# Patient Record
Sex: MALE | Race: WHITE | NOT HISPANIC OR LATINO | Employment: OTHER | URBAN - NONMETROPOLITAN AREA
[De-identification: names, ages, dates, MRNs, and addresses within clinical notes are randomized per-mention and may not be internally consistent; named-entity substitution may affect disease eponyms.]

---

## 2017-02-10 RX ORDER — ALLOPURINOL 300 MG/1
TABLET ORAL
Qty: 30 TABLET | Refills: 0 | Status: SHIPPED | OUTPATIENT
Start: 2017-02-10 | End: 2017-03-24 | Stop reason: SDUPTHER

## 2017-02-10 RX ORDER — COLCHICINE 0.6 MG/1
TABLET ORAL
Qty: 30 TABLET | Refills: 0 | Status: SHIPPED | OUTPATIENT
Start: 2017-02-10 | End: 2017-08-16 | Stop reason: SDUPTHER

## 2017-02-16 ENCOUNTER — OFFICE VISIT (OUTPATIENT)
Dept: FAMILY MEDICINE CLINIC | Facility: CLINIC | Age: 62
End: 2017-02-16

## 2017-02-16 ENCOUNTER — LAB (OUTPATIENT)
Dept: LAB | Facility: OTHER | Age: 62
End: 2017-02-16

## 2017-02-16 VITALS
OXYGEN SATURATION: 98 % | HEART RATE: 94 BPM | WEIGHT: 167.8 LBS | BODY MASS INDEX: 25.43 KG/M2 | HEIGHT: 68 IN | TEMPERATURE: 100 F

## 2017-02-16 DIAGNOSIS — R05.9 COUGH: ICD-10-CM

## 2017-02-16 DIAGNOSIS — R53.83 FATIGUE, UNSPECIFIED TYPE: ICD-10-CM

## 2017-02-16 DIAGNOSIS — R05.9 COUGH: Primary | ICD-10-CM

## 2017-02-16 DIAGNOSIS — J11.1 FLU: ICD-10-CM

## 2017-02-16 DIAGNOSIS — Z00.00 ROUTINE MEDICAL EXAM: ICD-10-CM

## 2017-02-16 DIAGNOSIS — Z00.00 ROUTINE MEDICAL EXAM: Primary | ICD-10-CM

## 2017-02-16 LAB
FLUAV AG NPH QL: NEGATIVE
FLUBV AG NPH QL IA: POSITIVE

## 2017-02-16 PROCEDURE — 87804 INFLUENZA ASSAY W/OPTIC: CPT | Performed by: INTERNAL MEDICINE

## 2017-02-16 PROCEDURE — 99213 OFFICE O/P EST LOW 20 MIN: CPT | Performed by: INTERNAL MEDICINE

## 2017-02-16 RX ORDER — OSELTAMIVIR PHOSPHATE 75 MG/1
75 CAPSULE ORAL 2 TIMES DAILY
Qty: 10 CAPSULE | Refills: 0 | Status: SHIPPED | OUTPATIENT
Start: 2017-02-16 | End: 2017-11-06

## 2017-02-16 NOTE — PROGRESS NOTES
Bernadette Oscar is a 62 y.o. male.     Chief Complaint   Patient presents with   • Nasal Congestion     Started last Saturday. Has been around people with Flu.   • Generalized Body Aches   • Cough   • Fever        History of Present Illness     Pt in c/o congestion, cough, and fever.     Pt says he has a cough that is productive.  His ears have been hurting and he has a fever.  His throat is only sore when he coughs and has some pressure in his head.  He says he just feels bad with all these symptoms.     The following portions of the patient's history were reviewed and updated as appropriate: allergies, current medications, past family history, past medical history, past social history, past surgical history and problem list.    Review of Systems   Constitutional: Positive for fatigue and fever. Negative for activity change, appetite change and unexpected weight change.   HENT: Positive for ear pain, sinus pressure and sore throat. Negative for facial swelling and hearing loss.    Respiratory: Positive for cough. Negative for chest tightness, shortness of breath and wheezing.    Cardiovascular: Negative for chest pain, palpitations and leg swelling.   Gastrointestinal: Negative for abdominal pain.   Genitourinary: Negative for difficulty urinating, dysuria, flank pain, frequency and urgency.   Musculoskeletal: Negative for arthralgias and back pain.   Skin: Negative for color change and rash.   Allergic/Immunologic: Negative for environmental allergies and food allergies.   Neurological: Negative for dizziness, syncope, weakness, numbness and headaches.   Hematological: Negative for adenopathy.   Psychiatric/Behavioral: Negative for confusion, decreased concentration, dysphoric mood, sleep disturbance and suicidal ideas. The patient is not nervous/anxious.    All other systems reviewed and are negative.    In past two weeks the patient has not felt down, depressed, hopeless, or lost interest in doing  things.      Objective   Physical Exam   Constitutional: He is oriented to person, place, and time. Vital signs are normal. He appears well-developed and well-nourished.   HENT:   Head: Normocephalic and atraumatic.   Right Ear: Hearing and external ear normal. There is tenderness.   Left Ear: Hearing and external ear normal.   Nose: Right sinus exhibits frontal sinus tenderness. Left sinus exhibits frontal sinus tenderness.   Eyes: Conjunctivae and EOM are normal. Pupils are equal, round, and reactive to light.   Neck: Normal range of motion. Neck supple.   Cardiovascular: Normal rate and regular rhythm.  Exam reveals no gallop and no friction rub.    No murmur heard.  Pulmonary/Chest: Effort normal. No respiratory distress. He has no wheezes. He has rhonchi. He has no rales.   Abdominal: Soft. He exhibits no distension. There is no tenderness. There is no rebound and no guarding.   Musculoskeletal: Normal range of motion. He exhibits no edema.   Neurological: He is alert and oriented to person, place, and time. No cranial nerve deficit.   Skin: Skin is warm and dry. No rash noted.   Psychiatric: He has a normal mood and affect. His behavior is normal. Judgment and thought content normal. His mood appears not anxious. He does not exhibit a depressed mood. He expresses no homicidal and no suicidal ideation.   Nursing note and vitals reviewed.      Assessment/Plan   Don was seen today for nasal congestion, generalized body aches, cough and fever.    Diagnoses and all orders for this visit:    Routine medical exam  -     Cancel: Influenza Antigen; Future  -     Influenza Antigen; Future    Cough  Comments:  new    Fatigue, unspecified type  Comments:  new    Flu  Comments:  New    Other orders  -     oseltamivir (TAMIFLU) 75 MG capsule; Take 1 capsule by mouth 2 (Two) Times a Day.        STAT     CXR - coughing  Flu Swab - exposed to flu/coughing.    Scribed for Dr. Munoz by Fabiano Jauregui 02/16/17       CXR normal.     Flu swab was Positive    Add Tamiflu X5 days for flu.    Scribed for Dr. Munoz by Fabiano Jauregui 02/16/17

## 2017-03-24 RX ORDER — ALLOPURINOL 300 MG/1
TABLET ORAL
Qty: 30 TABLET | Refills: 0 | Status: SHIPPED | OUTPATIENT
Start: 2017-03-24 | End: 2017-08-16 | Stop reason: SDUPTHER

## 2017-03-24 RX ORDER — ALLOPURINOL 100 MG/1
TABLET ORAL
Qty: 30 TABLET | Refills: 0 | Status: SHIPPED | OUTPATIENT
Start: 2017-03-24 | End: 2017-08-16 | Stop reason: SDUPTHER

## 2017-08-17 RX ORDER — ALLOPURINOL 100 MG/1
TABLET ORAL
Qty: 30 TABLET | Refills: 0 | Status: SHIPPED | OUTPATIENT
Start: 2017-08-17 | End: 2017-11-06 | Stop reason: SDUPTHER

## 2017-08-17 RX ORDER — ALLOPURINOL 300 MG/1
TABLET ORAL
Qty: 30 TABLET | Refills: 0 | Status: SHIPPED | OUTPATIENT
Start: 2017-08-17 | End: 2017-11-06 | Stop reason: SDUPTHER

## 2017-08-17 RX ORDER — COLCHICINE 0.6 MG/1
CAPSULE ORAL
Qty: 30 CAPSULE | Refills: 0 | Status: SHIPPED | OUTPATIENT
Start: 2017-08-17 | End: 2017-12-18

## 2017-11-06 DIAGNOSIS — Z00.00 ROUTINE GENERAL MEDICAL EXAMINATION AT A HEALTH CARE FACILITY: Primary | ICD-10-CM

## 2017-11-06 RX ORDER — ALLOPURINOL 100 MG/1
TABLET ORAL
Qty: 30 TABLET | Refills: 0 | OUTPATIENT
Start: 2017-11-06

## 2017-11-06 RX ORDER — ALLOPURINOL 100 MG/1
100 TABLET ORAL DAILY
Qty: 14 TABLET | Refills: 0 | Status: SHIPPED | OUTPATIENT
Start: 2017-11-06 | End: 2018-02-27 | Stop reason: SDUPTHER

## 2017-11-06 RX ORDER — ALLOPURINOL 300 MG/1
300 TABLET ORAL DAILY
Qty: 14 TABLET | Refills: 0 | Status: SHIPPED | OUTPATIENT
Start: 2017-11-06 | End: 2018-02-27 | Stop reason: SDUPTHER

## 2017-11-06 RX ORDER — ALLOPURINOL 300 MG/1
TABLET ORAL
Qty: 30 TABLET | Refills: 0 | Status: CANCELLED | OUTPATIENT
Start: 2017-11-06

## 2017-11-20 ENCOUNTER — LAB (OUTPATIENT)
Dept: LAB | Facility: OTHER | Age: 62
End: 2017-11-20

## 2017-11-20 DIAGNOSIS — Z00.00 ROUTINE GENERAL MEDICAL EXAMINATION AT A HEALTH CARE FACILITY: ICD-10-CM

## 2017-11-20 LAB
ALBUMIN SERPL-MCNC: 4 G/DL (ref 3.2–5.5)
ALBUMIN/GLOB SERPL: 1.5 G/DL (ref 1–3)
ALP SERPL-CCNC: 71 U/L (ref 15–121)
ALT SERPL W P-5'-P-CCNC: 27 U/L (ref 10–60)
ANION GAP SERPL CALCULATED.3IONS-SCNC: 9 MMOL/L (ref 5–15)
AST SERPL-CCNC: 21 U/L (ref 10–60)
BASOPHILS # BLD AUTO: 0.01 10*3/MM3 (ref 0–0.2)
BASOPHILS NFR BLD AUTO: 0.1 % (ref 0–2)
BILIRUB SERPL-MCNC: 0.9 MG/DL (ref 0.2–1)
BUN BLD-MCNC: 23 MG/DL (ref 8–25)
BUN/CREAT SERPL: 20.9 (ref 7–25)
CALCIUM SPEC-SCNC: 9.1 MG/DL (ref 8.4–10.8)
CHLORIDE SERPL-SCNC: 106 MMOL/L (ref 100–112)
CHOLEST SERPL-MCNC: 235 MG/DL (ref 150–200)
CO2 SERPL-SCNC: 28 MMOL/L (ref 20–32)
CREAT BLD-MCNC: 1.1 MG/DL (ref 0.4–1.3)
DEPRECATED RDW RBC AUTO: 40.7 FL (ref 35.1–43.9)
EOSINOPHIL # BLD AUTO: 0.13 10*3/MM3 (ref 0–0.7)
EOSINOPHIL NFR BLD AUTO: 1.9 % (ref 0–7)
ERYTHROCYTE [DISTWIDTH] IN BLOOD BY AUTOMATED COUNT: 12.5 % (ref 11.5–14.5)
GFR SERPL CREATININE-BSD FRML MDRD: 68 ML/MIN/1.73 (ref 49–113)
GLOBULIN UR ELPH-MCNC: 2.7 GM/DL (ref 2.5–4.6)
GLUCOSE BLD-MCNC: 119 MG/DL (ref 70–100)
HCT VFR BLD AUTO: 44.5 % (ref 39–49)
HDLC SERPL-MCNC: 36 MG/DL (ref 35–100)
HGB BLD-MCNC: 14.7 G/DL (ref 13.7–17.3)
LDLC SERPL CALC-MCNC: 176 MG/DL
LDLC/HDLC SERPL: 4.88 {RATIO}
LYMPHOCYTES # BLD AUTO: 1.6 10*3/MM3 (ref 0.6–4.2)
LYMPHOCYTES NFR BLD AUTO: 23.6 % (ref 10–50)
MCH RBC QN AUTO: 30.3 PG (ref 26.5–34)
MCHC RBC AUTO-ENTMCNC: 33 G/DL (ref 31.5–36.3)
MCV RBC AUTO: 91.8 FL (ref 80–98)
MONOCYTES # BLD AUTO: 0.53 10*3/MM3 (ref 0–0.9)
MONOCYTES NFR BLD AUTO: 7.8 % (ref 0–12)
NEUTROPHILS # BLD AUTO: 4.51 10*3/MM3 (ref 2–8.6)
NEUTROPHILS NFR BLD AUTO: 66.6 % (ref 37–80)
PLATELET # BLD AUTO: 282 10*3/MM3 (ref 150–450)
PMV BLD AUTO: 9 FL (ref 8–12)
POTASSIUM BLD-SCNC: 4.2 MMOL/L (ref 3.4–5.4)
PROT SERPL-MCNC: 6.7 G/DL (ref 6.7–8.2)
RBC # BLD AUTO: 4.85 10*6/MM3 (ref 4.37–5.74)
SODIUM BLD-SCNC: 143 MMOL/L (ref 134–146)
T4 SERPL-MCNC: 6.69 MCG/DL (ref 5.5–11)
TRIGL SERPL-MCNC: 117 MG/DL (ref 35–160)
TSH SERPL DL<=0.05 MIU/L-ACNC: 0.95 MIU/ML (ref 0.46–4.68)
VLDLC SERPL-MCNC: 23.4 MG/DL
WBC NRBC COR # BLD: 6.78 10*3/MM3 (ref 3.2–9.8)

## 2017-11-20 PROCEDURE — 36415 COLL VENOUS BLD VENIPUNCTURE: CPT | Performed by: INTERNAL MEDICINE

## 2017-11-20 PROCEDURE — 84443 ASSAY THYROID STIM HORMONE: CPT | Performed by: INTERNAL MEDICINE

## 2017-11-20 PROCEDURE — 84436 ASSAY OF TOTAL THYROXINE: CPT | Performed by: INTERNAL MEDICINE

## 2017-11-20 PROCEDURE — 85025 COMPLETE CBC W/AUTO DIFF WBC: CPT | Performed by: INTERNAL MEDICINE

## 2017-11-20 PROCEDURE — 80061 LIPID PANEL: CPT | Performed by: INTERNAL MEDICINE

## 2017-11-20 PROCEDURE — 80053 COMPREHEN METABOLIC PANEL: CPT | Performed by: INTERNAL MEDICINE

## 2017-12-05 ENCOUNTER — TELEPHONE (OUTPATIENT)
Dept: FAMILY MEDICINE CLINIC | Facility: CLINIC | Age: 62
End: 2017-12-05

## 2017-12-05 NOTE — TELEPHONE ENCOUNTER
Call placed to pt at this time and unable to reach but did leave VM for pt to call [phone] at this time and did put orders in for Crestor 10mg po Q hs and lipid panel in 4 months and BMP and A1C in 6 wks.

## 2017-12-05 NOTE — TELEPHONE ENCOUNTER
----- Message from Basia Renee sent at 11/30/2017 12:10 PM CST -----  Appointment canceled twice would like to know about labs

## 2017-12-18 ENCOUNTER — OFFICE VISIT (OUTPATIENT)
Dept: FAMILY MEDICINE CLINIC | Facility: CLINIC | Age: 62
End: 2017-12-18

## 2017-12-18 VITALS
BODY MASS INDEX: 27.13 KG/M2 | DIASTOLIC BLOOD PRESSURE: 78 MMHG | SYSTOLIC BLOOD PRESSURE: 144 MMHG | HEIGHT: 68 IN | WEIGHT: 179 LBS | HEART RATE: 72 BPM | OXYGEN SATURATION: 99 %

## 2017-12-18 DIAGNOSIS — J06.9 VIRAL UPPER RESPIRATORY TRACT INFECTION: Primary | ICD-10-CM

## 2017-12-18 DIAGNOSIS — E78.01 FAMILIAL HYPERCHOLESTEROLEMIA: Chronic | ICD-10-CM

## 2017-12-18 PROCEDURE — 99214 OFFICE O/P EST MOD 30 MIN: CPT | Performed by: INTERNAL MEDICINE

## 2017-12-18 PROCEDURE — 96372 THER/PROPH/DIAG INJ SC/IM: CPT | Performed by: INTERNAL MEDICINE

## 2017-12-18 RX ORDER — AZITHROMYCIN 250 MG/1
TABLET, FILM COATED ORAL
Qty: 6 TABLET | Refills: 0 | Status: SHIPPED | OUTPATIENT
Start: 2017-12-18 | End: 2018-06-08

## 2017-12-18 RX ORDER — TRIAMCINOLONE ACETONIDE 40 MG/ML
40 INJECTION, SUSPENSION INTRA-ARTICULAR; INTRAMUSCULAR ONCE
Status: COMPLETED | OUTPATIENT
Start: 2017-12-18 | End: 2017-12-18

## 2017-12-18 RX ADMIN — TRIAMCINOLONE ACETONIDE 40 MG: 40 INJECTION, SUSPENSION INTRA-ARTICULAR; INTRAMUSCULAR at 16:37

## 2018-02-28 RX ORDER — ALLOPURINOL 100 MG/1
TABLET ORAL
Qty: 14 TABLET | Refills: 0 | Status: SHIPPED | OUTPATIENT
Start: 2018-02-28 | End: 2018-04-24 | Stop reason: SDUPTHER

## 2018-02-28 RX ORDER — ALLOPURINOL 300 MG/1
TABLET ORAL
Qty: 14 TABLET | Refills: 0 | Status: SHIPPED | OUTPATIENT
Start: 2018-02-28 | End: 2018-04-24 | Stop reason: SDUPTHER

## 2018-04-24 RX ORDER — ALLOPURINOL 100 MG/1
TABLET ORAL
Qty: 14 TABLET | Refills: 0 | Status: SHIPPED | OUTPATIENT
Start: 2018-04-24 | End: 2018-08-10 | Stop reason: SDUPTHER

## 2018-04-24 RX ORDER — ALLOPURINOL 300 MG/1
TABLET ORAL
Qty: 14 TABLET | Refills: 0 | Status: SHIPPED | OUTPATIENT
Start: 2018-04-24 | End: 2018-06-20

## 2018-06-08 ENCOUNTER — OFFICE VISIT (OUTPATIENT)
Dept: FAMILY MEDICINE CLINIC | Facility: CLINIC | Age: 63
End: 2018-06-08

## 2018-06-08 VITALS
HEART RATE: 70 BPM | HEIGHT: 68 IN | BODY MASS INDEX: 25.31 KG/M2 | DIASTOLIC BLOOD PRESSURE: 80 MMHG | SYSTOLIC BLOOD PRESSURE: 170 MMHG | OXYGEN SATURATION: 98 % | WEIGHT: 167 LBS

## 2018-06-08 DIAGNOSIS — E78.2 MIXED HYPERLIPIDEMIA: Chronic | ICD-10-CM

## 2018-06-08 DIAGNOSIS — I10 ESSENTIAL HYPERTENSION: Chronic | ICD-10-CM

## 2018-06-08 DIAGNOSIS — Z12.5 SPECIAL SCREENING FOR MALIGNANT NEOPLASM OF PROSTATE: ICD-10-CM

## 2018-06-08 DIAGNOSIS — R73.01 IMPAIRED FASTING GLUCOSE: Chronic | ICD-10-CM

## 2018-06-08 DIAGNOSIS — M1A.9XX0 CHRONIC GOUT WITHOUT TOPHUS, UNSPECIFIED CAUSE, UNSPECIFIED SITE: Chronic | ICD-10-CM

## 2018-06-08 DIAGNOSIS — R55 SYNCOPE, UNSPECIFIED SYNCOPE TYPE: Primary | ICD-10-CM

## 2018-06-08 PROBLEM — Z77.22 SECONDHAND SMOKE EXPOSURE: Chronic | Status: ACTIVE | Noted: 2018-06-08

## 2018-06-08 PROCEDURE — 93000 ELECTROCARDIOGRAM COMPLETE: CPT | Performed by: INTERNAL MEDICINE

## 2018-06-08 PROCEDURE — 99214 OFFICE O/P EST MOD 30 MIN: CPT | Performed by: INTERNAL MEDICINE

## 2018-06-08 RX ORDER — LOSARTAN POTASSIUM 50 MG/1
50 TABLET ORAL DAILY
Qty: 30 TABLET | Refills: 11 | Status: SHIPPED | OUTPATIENT
Start: 2018-06-08 | End: 2019-05-28 | Stop reason: SDUPTHER

## 2018-06-08 NOTE — PROGRESS NOTES
Subjective      History of Present Illness     Don Oscar is a 63 y.o. male who comes in today to establish care.  He is here with his wife, Beba.  They live in [facility] [place] [facility] and enjoy camping.  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   He is a prior patient of Dr. Cecil Munoz.  He reports a history of syncopal episodes.  He denies ever undergoing an extensive workup to determine the etiology of these episodes.    He has a history of gout.  His last gout attack was approximately seven weeks ago.  He is prescribed allopurinol 400 mg to take when he is having acute gout flares, but has never taken it daily as prophylaxis.      He takes an 81 mg aspirin q.o.d. He does not take it daily due to easy bruising.  GERD symptoms adequately controlled with OTC antacids.  Denies melena or dysphagia.    He has a strong family history of coronary artery disease.  He reports his brother had MI with stent placement.  He is not a smoker, but he is exposed to second hand smoke, as his wife is a smoker.  He smoked for about 7 years, stopping in his early twenties.   He denies current illicit drug use.  He reports history of drug use, but stopped around age 29.  He states he was told he had heart valve leak in the past due to past drug use, but denies ever using any IV drugs.  On exam today, I heard some rare ectopy versus brief sinus pauses.  EKG today reveals normal sinus rhythm with no pauses or ectopy.            He was camping at [facility] on June 2nd when his wife found him unresponsive due to an apparent syncopal episode.  There was a nurse camping next to them who assessed him.  He spontaneously recovered.  There was no witnessed seizure activity.  He remembers waking up to a stranger shouting at him to see if he was okay.  He was immediately transported him to [facility].  He was seen in the ER and given IV fluids as well as Zofran and clonidine for  hypertension.  Admission was recommended, but patient declined admission.  He admits to drinking two beer the night prior to this syncopal episode, but denies any drinking on the day that it occurred.  He denies any unilateral weakness, slurred speech, or facial droop at the time of this episode.  He reports history of syncopal episode approximately once a year, but denies ever having a cardiac workup.  He reports he has experienced other similar syncopal episodes occasionally throughout the years.  He feels that he has some mild residual proximal left upper extremity weakness following this most recent syncopal episode.   Abduction of the left upper extremity does seem to be slightly weaker than his right.  He is right-hand dominant.      Labs obtained at [facility] reviewed today.  CBC unremarkable.  Fasting glucose 192.  Creatinine 1.5.   His baseline creatinine appears to be 1.0.    I have reviewed labs from last Fall as well as 2015, which reveal evidence of impaired fasting glucose.  Total cholesterol is above goal.  HDL 36.  LDL above goal at 176.  Triglycerides 117.  Renal function has been normal with baseline serum creatinine 1.0.  It has been several years since he has received a PSA.  He is going to come in next week to get some appropriate labs.         Review of Systems   Constitutional: Negative for chills, fatigue and fever.   HENT: Negative for congestion, ear pain, postnasal drip, sinus pressure and sore throat.    Respiratory: Negative for cough, shortness of breath and wheezing.    Cardiovascular: Negative for chest pain, palpitations and leg swelling.   Gastrointestinal: Negative for abdominal pain, blood in stool, constipation, diarrhea, nausea and vomiting.   Endocrine: Negative for cold intolerance, heat intolerance, polydipsia and polyuria.   Genitourinary: Negative for dysuria, frequency, hematuria and urgency.   Skin: Negative for rash.   Neurological: Negative for syncope and  "weakness.        Objective     Vitals:    06/08/18 1525   BP: 170/80   Pulse: 70   SpO2: 98%   Weight: 75.8 kg (167 lb)   Height: 172.7 cm (68\")       Physical Exam   Constitutional: He is oriented to person, place, and time. He appears well-developed and well-nourished. No distress.   Accompanied by his wife, Beba.      HENT:   Head: Normocephalic and atraumatic.   Nose: Right sinus exhibits no maxillary sinus tenderness and no frontal sinus tenderness. Left sinus exhibits no maxillary sinus tenderness and no frontal sinus tenderness.   Mouth/Throat: Uvula is midline, oropharynx is clear and moist and mucous membranes are normal. No oral lesions. No tonsillar exudate.   Clear postnasal drip.    Eyes: Conjunctivae and EOM are normal. Pupils are equal, round, and reactive to light.   Bilateral sclera appears red/irritated.  He reports chronic dry eyes.  He denies significant allergy symptoms   Neck: Trachea normal. Neck supple. No JVD present. Carotid bruit is not present. No tracheal deviation present. No thyroid mass and no thyromegaly present.   Cardiovascular: Regular rhythm, normal heart sounds and intact distal pulses.   No extrasystoles are present. PMI is not displaced.    No murmur heard.  Regular rhythm with occasional brief pauses, which are asymptomatic.  2/6 systolic murmur heard at left sternal border.     Pulmonary/Chest: Effort normal and breath sounds normal. No accessory muscle usage. No respiratory distress. He has no decreased breath sounds. He has no wheezes. He has no rhonchi. He has no rales.   A few chronic lung sounds are noted, but no wheezes.   Abdominal: Soft. Bowel sounds are normal. He exhibits no distension. There is no hepatosplenomegaly. There is no tenderness.     Vascular Status -  His right foot exhibits abnormal foot vasculature  (Diminished pulses bilateral ankles. ). His right foot exhibits no edema. His left foot exhibits abnormal foot vasculature . His left foot exhibits no " edema.  Lymphadenopathy:     He has no cervical adenopathy.   Neurological: He is alert and oriented to person, place, and time. No cranial nerve deficit. Coordination normal.   Neuro exam is symmetrical and normal other than left upper extremity abduction 4/5.   Skin: Skin is warm, dry and intact. No rash noted. No cyanosis. Nails show no clubbing.   Psychiatric: He has a normal mood and affect. His speech is normal and behavior is normal. Judgment and thought content normal.   Vitals reviewed.       ECG 12 Lead  Date/Time: 6/8/2018 4:28 PM  Performed by: ETHAN FIELDS  Authorized by: ETHAN FIELDS   Comparison: not compared with previous ECG   Previous ECG: no previous ECG available  Rhythm: sinus rhythm  Rate: normal  Conduction: conduction normal  ST Segments: ST segments normal  T Waves: T waves normal  QRS axis: normal  Other: no other findings  Clinical impression: normal ECG  Comments: No ectopy or pauses noted.            Assessment/Plan      EKG is completed today revealing normal sinus rhythm.  I feel that he needs a thorough workup to determine the etiology of the syncopal episodes.  We will refer to [facility] in [place] for appropriate evaluation.  I recommended they call EMS immediately for any similar events.  Take a baby aspirin daily.  With his serum creatinine elevated in the [facility] ER, he may have been dehydrated at the time of his syncopal episode.  The differential diagnosis is very large.    A prescription is sent for losartan 50 mg to take one tablet daily.  Monitor blood pressure.  The losartan should be a good choice in this patient with history of gout.    Check uric acid level with next week's labs.  If well above goal, we will start some daily allopurinol prophylaxis.  In the future, we will use more appropriate medications to treat acute gout episodes if needed.    I will include a hemoglobin A1c in next week's labs due to his history of impaired fasting  glucose.    He will return next week for fasting labs and in two weeks for follow up and review of lab results.  I asked him to bring in the name of his OTC antacid with his next visit.       Scribed for Dr. Ríos by Ceci Hensley [facility].     Diagnoses and all orders for this visit:    Syncope, unspecified syncope type  -     Ambulatory Referral to Cardiology    Essential hypertension  -     CBC Auto Differential; Future  -     Comprehensive Metabolic Panel; Future    Mixed hyperlipidemia  -     Lipid Panel; Future  -     TSH; Future    Impaired fasting glucose  -     Hemoglobin A1c; Future    Chronic gout without tophus, unspecified cause, unspecified site  -     Uric Acid; Future    Special screening for malignant neoplasm of prostate  -     PSA Screen; Future    Other orders  -     losartan (COZAAR) 50 MG tablet; Take 1 tablet by mouth Daily.        No visits with results within 3 Week(s) from this visit.   Latest known visit with results is:   Lab on 11/20/2017   Component Date Value Ref Range Status   • Glucose 11/20/2017 119* 70 - 100 mg/dL Final   • BUN 11/20/2017 23  8 - 25 mg/dL Final   • Creatinine 11/20/2017 1.10  0.40 - 1.30 mg/dL Final   • Sodium 11/20/2017 143  134 - 146 mmol/L Final   • Potassium 11/20/2017 4.2  3.4 - 5.4 mmol/L Final   • Chloride 11/20/2017 106  100 - 112 mmol/L Final   • CO2 11/20/2017 28.0  20.0 - 32.0 mmol/L Final   • Calcium 11/20/2017 9.1  8.4 - 10.8 mg/dL Final   • Total Protein 11/20/2017 6.7  6.7 - 8.2 g/dL Final   • Albumin 11/20/2017 4.00  3.20 - 5.50 g/dL Final   • ALT (SGPT) 11/20/2017 27  10 - 60 U/L Final   • AST (SGOT) 11/20/2017 21  10 - 60 U/L Final   • Alkaline Phosphatase 11/20/2017 71  15 - 121 U/L Final   • Total Bilirubin 11/20/2017 0.9  0.2 - 1.0 mg/dL Final   • eGFR Non  Amer 11/20/2017 68  49 - 113 mL/min/1.73 Final   • Globulin 11/20/2017 2.7  2.5 - 4.6 gm/dL Final   • A/G Ratio 11/20/2017 1.5  1.0 - 3.0 g/dL Final   • BUN/Creatinine Ratio  11/20/2017 20.9  7.0 - 25.0 Final   • Anion Gap 11/20/2017 9.0  5.0 - 15.0 mmol/L Final   • TSH 11/20/2017 0.950  0.460 - 4.680 mIU/mL Final   • T4, Total 11/20/2017 6.69  5.50 - 11.00 mcg/dL Final   • Total Cholesterol 11/20/2017 235* 150 - 200 mg/dL Final   • Triglycerides 11/20/2017 117  35 - 160 mg/dL Final   • HDL Cholesterol 11/20/2017 36  35 - 100 mg/dL Final   • LDL Cholesterol  11/20/2017 176  mg/dL Final   • VLDL Cholesterol 11/20/2017 23.4  mg/dL Final   • LDL/HDL Ratio 11/20/2017 4.88   Final   • WBC 11/20/2017 6.78  3.20 - 9.80 10*3/mm3 Final   • RBC 11/20/2017 4.85  4.37 - 5.74 10*6/mm3 Final   • Hemoglobin 11/20/2017 14.7  13.7 - 17.3 g/dL Final   • Hematocrit 11/20/2017 44.5  39.0 - 49.0 % Final   • MCV 11/20/2017 91.8  80.0 - 98.0 fL Final   • MCH 11/20/2017 30.3  26.5 - 34.0 pg Final   • MCHC 11/20/2017 33.0  31.5 - 36.3 g/dL Final   • RDW 11/20/2017 12.5  11.5 - 14.5 % Final   • RDW-SD 11/20/2017 40.7  35.1 - 43.9 fl Final   • MPV 11/20/2017 9.0  8.0 - 12.0 fL Final   • Platelets 11/20/2017 282  150 - 450 10*3/mm3 Final   • Neutrophil % 11/20/2017 66.6  37.0 - 80.0 % Final   • Lymphocyte % 11/20/2017 23.6  10.0 - 50.0 % Final   • Monocyte % 11/20/2017 7.8  0.0 - 12.0 % Final   • Eosinophil % 11/20/2017 1.9  0.0 - 7.0 % Final   • Basophil % 11/20/2017 0.1  0.0 - 2.0 % Final   • Neutrophils, Absolute 11/20/2017 4.51  2.00 - 8.60 10*3/mm3 Final   • Lymphocytes, Absolute 11/20/2017 1.60  0.60 - 4.20 10*3/mm3 Final   • Monocytes, Absolute 11/20/2017 0.53  0.00 - 0.90 10*3/mm3 Final   • Eosinophils, Absolute 11/20/2017 0.13  0.00 - 0.70 10*3/mm3 Final   • Basophils, Absolute 11/20/2017 0.01  0.00 - 0.20 10*3/mm3 Final   ]

## 2018-06-11 ENCOUNTER — LAB (OUTPATIENT)
Dept: LAB | Facility: OTHER | Age: 63
End: 2018-06-11

## 2018-06-11 DIAGNOSIS — E78.2 MIXED HYPERLIPIDEMIA: Chronic | ICD-10-CM

## 2018-06-11 DIAGNOSIS — Z12.5 SPECIAL SCREENING FOR MALIGNANT NEOPLASM OF PROSTATE: ICD-10-CM

## 2018-06-11 DIAGNOSIS — R73.01 IMPAIRED FASTING GLUCOSE: Chronic | ICD-10-CM

## 2018-06-11 DIAGNOSIS — I10 ESSENTIAL HYPERTENSION: Chronic | ICD-10-CM

## 2018-06-11 DIAGNOSIS — M1A.9XX0 CHRONIC GOUT WITHOUT TOPHUS, UNSPECIFIED CAUSE, UNSPECIFIED SITE: Chronic | ICD-10-CM

## 2018-06-11 LAB
ALBUMIN SERPL-MCNC: 4.2 G/DL (ref 3.2–5.5)
ALBUMIN/GLOB SERPL: 1.6 G/DL (ref 1–3)
ALP SERPL-CCNC: 72 U/L (ref 15–121)
ALT SERPL W P-5'-P-CCNC: 20 U/L (ref 10–60)
ANION GAP SERPL CALCULATED.3IONS-SCNC: 8 MMOL/L (ref 5–15)
AST SERPL-CCNC: 17 U/L (ref 10–60)
BASOPHILS # BLD AUTO: 0.02 10*3/MM3 (ref 0–0.2)
BASOPHILS NFR BLD AUTO: 0.3 % (ref 0–2)
BILIRUB SERPL-MCNC: 1.1 MG/DL (ref 0.2–1)
BUN BLD-MCNC: 19 MG/DL (ref 8–25)
BUN/CREAT SERPL: 15.8 (ref 7–25)
CALCIUM SPEC-SCNC: 9.2 MG/DL (ref 8.4–10.8)
CHLORIDE SERPL-SCNC: 101 MMOL/L (ref 100–112)
CHOLEST SERPL-MCNC: 235 MG/DL (ref 150–200)
CO2 SERPL-SCNC: 29 MMOL/L (ref 20–32)
CREAT BLD-MCNC: 1.2 MG/DL (ref 0.4–1.3)
DEPRECATED RDW RBC AUTO: 41.6 FL (ref 35.1–43.9)
EOSINOPHIL # BLD AUTO: 0.14 10*3/MM3 (ref 0–0.7)
EOSINOPHIL NFR BLD AUTO: 1.9 % (ref 0–7)
ERYTHROCYTE [DISTWIDTH] IN BLOOD BY AUTOMATED COUNT: 12.8 % (ref 11.5–14.5)
GFR SERPL CREATININE-BSD FRML MDRD: 61 ML/MIN/1.73 (ref 49–113)
GLOBULIN UR ELPH-MCNC: 2.7 GM/DL (ref 2.5–4.6)
GLUCOSE BLD-MCNC: 124 MG/DL (ref 70–100)
HBA1C MFR BLD: 5.8 % (ref 4–5.6)
HCT VFR BLD AUTO: 46.7 % (ref 39–49)
HDLC SERPL-MCNC: 33 MG/DL (ref 35–100)
HGB BLD-MCNC: 15.7 G/DL (ref 13.7–17.3)
LDLC SERPL CALC-MCNC: 170 MG/DL
LDLC/HDLC SERPL: 5.15 {RATIO}
LYMPHOCYTES # BLD AUTO: 1.82 10*3/MM3 (ref 0.6–4.2)
LYMPHOCYTES NFR BLD AUTO: 24.9 % (ref 10–50)
MCH RBC QN AUTO: 30.4 PG (ref 26.5–34)
MCHC RBC AUTO-ENTMCNC: 33.6 G/DL (ref 31.5–36.3)
MCV RBC AUTO: 90.5 FL (ref 80–98)
MONOCYTES # BLD AUTO: 0.61 10*3/MM3 (ref 0–0.9)
MONOCYTES NFR BLD AUTO: 8.3 % (ref 0–12)
NEUTROPHILS # BLD AUTO: 4.72 10*3/MM3 (ref 2–8.6)
NEUTROPHILS NFR BLD AUTO: 64.6 % (ref 37–80)
PLATELET # BLD AUTO: 285 10*3/MM3 (ref 150–450)
PMV BLD AUTO: 9 FL (ref 8–12)
POTASSIUM BLD-SCNC: 3.9 MMOL/L (ref 3.4–5.4)
PROT SERPL-MCNC: 6.9 G/DL (ref 6.7–8.2)
PSA SERPL-MCNC: 0.83 NG/ML (ref 0–4)
RBC # BLD AUTO: 5.16 10*6/MM3 (ref 4.37–5.74)
SODIUM BLD-SCNC: 138 MMOL/L (ref 134–146)
TRIGL SERPL-MCNC: 161 MG/DL (ref 35–160)
TSH SERPL DL<=0.05 MIU/L-ACNC: 1.59 MIU/ML (ref 0.46–4.68)
URATE SERPL-MCNC: 8.2 MG/DL (ref 2.6–7.2)
VLDLC SERPL-MCNC: 32.2 MG/DL
WBC NRBC COR # BLD: 7.31 10*3/MM3 (ref 3.2–9.8)

## 2018-06-11 PROCEDURE — 36415 COLL VENOUS BLD VENIPUNCTURE: CPT | Performed by: INTERNAL MEDICINE

## 2018-06-11 PROCEDURE — 83036 HEMOGLOBIN GLYCOSYLATED A1C: CPT | Performed by: INTERNAL MEDICINE

## 2018-06-11 PROCEDURE — 80061 LIPID PANEL: CPT | Performed by: INTERNAL MEDICINE

## 2018-06-11 PROCEDURE — 85025 COMPLETE CBC W/AUTO DIFF WBC: CPT | Performed by: INTERNAL MEDICINE

## 2018-06-11 PROCEDURE — 84550 ASSAY OF BLOOD/URIC ACID: CPT | Performed by: INTERNAL MEDICINE

## 2018-06-11 PROCEDURE — 84443 ASSAY THYROID STIM HORMONE: CPT | Performed by: INTERNAL MEDICINE

## 2018-06-11 PROCEDURE — 80053 COMPREHEN METABOLIC PANEL: CPT | Performed by: INTERNAL MEDICINE

## 2018-06-11 PROCEDURE — G0103 PSA SCREENING: HCPCS | Performed by: INTERNAL MEDICINE

## 2018-06-20 ENCOUNTER — OFFICE VISIT (OUTPATIENT)
Dept: FAMILY MEDICINE CLINIC | Facility: CLINIC | Age: 63
End: 2018-06-20

## 2018-06-20 VITALS
BODY MASS INDEX: 25.46 KG/M2 | TEMPERATURE: 98.2 F | HEART RATE: 60 BPM | WEIGHT: 168 LBS | HEIGHT: 68 IN | SYSTOLIC BLOOD PRESSURE: 150 MMHG | DIASTOLIC BLOOD PRESSURE: 86 MMHG

## 2018-06-20 DIAGNOSIS — I10 ESSENTIAL HYPERTENSION: Primary | Chronic | ICD-10-CM

## 2018-06-20 DIAGNOSIS — M1A.9XX0 CHRONIC GOUT WITHOUT TOPHUS, UNSPECIFIED CAUSE, UNSPECIFIED SITE: Chronic | ICD-10-CM

## 2018-06-20 DIAGNOSIS — R73.01 IMPAIRED FASTING GLUCOSE: Chronic | ICD-10-CM

## 2018-06-20 DIAGNOSIS — E78.2 MIXED HYPERLIPIDEMIA: Chronic | ICD-10-CM

## 2018-06-20 DIAGNOSIS — Z77.22 SECONDHAND SMOKE EXPOSURE: Chronic | ICD-10-CM

## 2018-06-20 PROCEDURE — 99214 OFFICE O/P EST MOD 30 MIN: CPT | Performed by: INTERNAL MEDICINE

## 2018-06-20 RX ORDER — ATORVASTATIN CALCIUM 10 MG/1
10 TABLET, FILM COATED ORAL DAILY
Qty: 30 TABLET | Refills: 11 | Status: SHIPPED | OUTPATIENT
Start: 2018-06-20 | End: 2019-06-11 | Stop reason: SDUPTHER

## 2018-06-20 NOTE — PROGRESS NOTES
Subjective     History of Present Illness     Don Oscar is a 63 y.o. male who established care two weeks ago.  He lives in [facility] with his wife, Beba, and they enjoy camping.  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   He is a prior patient of Dr. Cecil Munoz.  He comes in today to review labs and address some of the issues discussed at his initial visit.     He has a strong family history of coronary artery disease.  He reports his brother had MI with stent placement.  On exam two weeks ago, I heard some rare ectopy versus brief sinus pauses.  EKG revealed normal sinus rhythm with no pauses or ectopy.  He had also been experiencing occasional syncopal episodes.  Refer to note dated 06/08/18 for details.  We referred to [facility] in [place] for appropriate evaluation and workup of syncopal episodes.  Appointment is scheduled with Dr. Rick Diaz next month. He is not a smoker, but he is exposed to second hand smoke, as his wife and rituon smoke in the home.  He smoked for about 7 years, stopping in his early twenties.  I ask him to try to convince his wife and stepson to smoke outside the home.      He has a history of gout.  His last gout attack was approximately seven weeks ago.  He is prescribed allopurinol 400 mg to take when he is having acute gout flares, but has never taken it daily as prophylaxis.  Uric acid remains above goal at 20 with current labs.  Our goal is to initiate more appropriate medications to treat acute gout episodes if needed. For now, we are going to have him start allopurinol 100 mg daily for gout prophylaxis.    Labs reveal evidence of impaired fasting glucose.  Carbohydrate reduction is recommended.      We started losartan two weeks ago.  Blood pressure remains above goal, but is improved at 150/86.  He reports he has felt better, but has not been checking it.  We will have him stay at this dose for  "now.  I asked him to stop by for a repeat blood pressure check in a couple of weeks.     His lipid panel is not at goal.  He has been on statin therapy in the past. We discussed cardiovascular risks associated with untreated cholesterol and recommended restarting statin therapy with Lipitor.  He voices agreement to this plan.        Colonoscopy in 2011 by Dr. Hayden revealed one polyp.  I encouraged repeat colonoscopy.  Patient wants to delay this until his next visit despite being made aware of risks of delaying procedure.      Serum creatinine was elevated in the [facility] ER at the time of his syncopal episode.  Current labs reveal slight decline in renal function from prior labs with creatinine 1.2.  I encouraged him to avoid chronic use of NSAIDs and other nephrotoxic agents and stay well hydrated.      The patient's relevant past medical, surgical, and social history was reviewed in Epic.   Lab results are reviewed with the patient today.  CBC unremarkable.   Fasting glucose 124. A1c is 5.8. Liver function normal.  PSA is at goal.        Review of Systems   Constitutional: Negative for chills, fatigue and fever.   HENT: Negative for congestion, ear pain, postnasal drip, sinus pressure and sore throat.    Respiratory: Negative for cough, shortness of breath and wheezing.    Cardiovascular: Negative for chest pain, palpitations and leg swelling.   Gastrointestinal: Negative for abdominal pain, blood in stool, constipation, diarrhea, nausea and vomiting.   Endocrine: Negative for cold intolerance, heat intolerance, polydipsia and polyuria.   Genitourinary: Negative for dysuria, frequency, hematuria and urgency.   Skin: Negative for rash.   Neurological: Negative for syncope and weakness.        Objective     Vitals:    06/20/18 0818   BP: 150/86   Pulse: 60   Temp: 98.2 °F (36.8 °C)   TempSrc: Oral   Weight: 76.2 kg (168 lb)   Height: 172.7 cm (68\")     Physical Exam   Constitutional: He is oriented to " person, place, and time. He appears well-developed and well-nourished. No distress.   HENT:   Head: Normocephalic and atraumatic.   Nose: Right sinus exhibits no maxillary sinus tenderness and no frontal sinus tenderness. Left sinus exhibits no maxillary sinus tenderness and no frontal sinus tenderness.   Mouth/Throat: Uvula is midline, oropharynx is clear and moist and mucous membranes are normal. No oral lesions. No tonsillar exudate.   Eyes: Conjunctivae and EOM are normal. Pupils are equal, round, and reactive to light.   Neck: Trachea normal. Neck supple. No JVD present. Carotid bruit is not present. No tracheal deviation present. No thyroid mass and no thyromegaly present.   Cardiovascular: Normal rate, regular rhythm, normal heart sounds and intact distal pulses.   No extrasystoles are present. PMI is not displaced.    No murmur heard.  2/6 systolic murmur heard at left sternal border   Pulmonary/Chest: Effort normal and breath sounds normal. No accessory muscle usage. No respiratory distress. He has no decreased breath sounds. He has no wheezes. He has no rhonchi. He has no rales.   Chronic lung sounds.    Abdominal: Soft. Bowel sounds are normal. He exhibits no distension. There is no hepatosplenomegaly. There is no tenderness.     Vascular Status -  His right foot exhibits abnormal foot vasculature  (Diminished pulses bilateral ankles.). His right foot exhibits no edema. His left foot exhibits abnormal foot vasculature . His left foot exhibits no edema.  Lymphadenopathy:     He has no cervical adenopathy.   Neurological: He is alert and oriented to person, place, and time. No cranial nerve deficit. Coordination normal.   Skin: Skin is warm, dry and intact. No rash noted. No cyanosis. Nails show no clubbing.   Psychiatric: He has a normal mood and affect. His speech is normal and behavior is normal. Judgment and thought content normal.   Vitals reviewed.     Future Appointments  Date Time Provider  Department Center   7/16/2018 11:15 AM Rick Diaz MD [facility] None   12/31/2018 8:30 AM Abel Ríos MD [facility] None     Assessment/Plan      Continue the losartan 50 mg to take one tablet daily.  Monitor blood pressure.  I asked him to stop by in a couple of weeks for blood pressure check.     Colonoscopy in 2011 by Dr. Hayden revealed one polyp.  I encouraged repeat colonoscopy.  Patient wants to delay this until his next visit despite being made aware of risks of delaying procedure.  We will address this again with next visit.     Start allopurinol 100 mg daily for gout prophylaxis.     Start Lipitor 10 mg daily and dietary efforts.     Keep the cardiology appointment with Dr. Rick Diaz to evaluate syncopal episodes. Continue daily 81 mg aspirin.    Return in six months for follow up with fasting labs one week prior.     Scribed for Dr. Ríos by Ceci Hensley [facility].     Diagnoses and all orders for this visit:    Essential hypertension  -     CBC Auto Differential; Future  -     Comprehensive Metabolic Panel; Future    Mixed hyperlipidemia  -     LDL Cholesterol, Direct; Future    Impaired fasting glucose  -     Hemoglobin A1c; Future    Chronic gout without tophus, unspecified cause, unspecified site  -     Uric Acid; Future    Secondhand smoke exposure    Other orders  -     atorvastatin (LIPITOR) 10 MG tablet; Take 1 tablet by mouth Daily. For cholesterol        Lab on 06/11/2018   Component Date Value Ref Range Status   • WBC 06/11/2018 7.31  3.20 - 9.80 10*3/mm3 Final   • RBC 06/11/2018 5.16  4.37 - 5.74 10*6/mm3 Final   • Hemoglobin 06/11/2018 15.7  13.7 - 17.3 g/dL Final   • Hematocrit 06/11/2018 46.7  39.0 - 49.0 % Final   • MCV 06/11/2018 90.5  80.0 - 98.0 fL Final   • MCH 06/11/2018 30.4  26.5 - 34.0 pg Final   • MCHC 06/11/2018 33.6  31.5 - 36.3 g/dL Final   • RDW 06/11/2018 12.8  11.5 - 14.5 % Final   • RDW-SD 06/11/2018 41.6  35.1 - 43.9 fl Final   • MPV 06/11/2018 9.0   8.0 - 12.0 fL Final   • Platelets 06/11/2018 285  150 - 450 10*3/mm3 Final   • Neutrophil % 06/11/2018 64.6  37.0 - 80.0 % Final   • Lymphocyte % 06/11/2018 24.9  10.0 - 50.0 % Final   • Monocyte % 06/11/2018 8.3  0.0 - 12.0 % Final   • Eosinophil % 06/11/2018 1.9  0.0 - 7.0 % Final   • Basophil % 06/11/2018 0.3  0.0 - 2.0 % Final   • Neutrophils, Absolute 06/11/2018 4.72  2.00 - 8.60 10*3/mm3 Final   • Lymphocytes, Absolute 06/11/2018 1.82  0.60 - 4.20 10*3/mm3 Final   • Monocytes, Absolute 06/11/2018 0.61  0.00 - 0.90 10*3/mm3 Final   • Eosinophils, Absolute 06/11/2018 0.14  0.00 - 0.70 10*3/mm3 Final   • Basophils, Absolute 06/11/2018 0.02  0.00 - 0.20 10*3/mm3 Final   • Glucose 06/11/2018 124* 70 - 100 mg/dL Final   • BUN 06/11/2018 19  8 - 25 mg/dL Final   • Creatinine 06/11/2018 1.20  0.40 - 1.30 mg/dL Final   • Sodium 06/11/2018 138  134 - 146 mmol/L Final   • Potassium 06/11/2018 3.9  3.4 - 5.4 mmol/L Final   • Chloride 06/11/2018 101  100 - 112 mmol/L Final   • CO2 06/11/2018 29.0  20.0 - 32.0 mmol/L Final   • Calcium 06/11/2018 9.2  8.4 - 10.8 mg/dL Final   • Total Protein 06/11/2018 6.9  6.7 - 8.2 g/dL Final   • Albumin 06/11/2018 4.20  3.20 - 5.50 g/dL Final   • ALT (SGPT) 06/11/2018 20  10 - 60 U/L Final   • AST (SGOT) 06/11/2018 17  10 - 60 U/L Final   • Alkaline Phosphatase 06/11/2018 72  15 - 121 U/L Final   • Total Bilirubin 06/11/2018 1.1* 0.2 - 1.0 mg/dL Final   • eGFR Non African Amer 06/11/2018 61  49 - 113 mL/min/1.73 Final   • Globulin 06/11/2018 2.7  2.5 - 4.6 gm/dL Final   • A/G Ratio 06/11/2018 1.6  1.0 - 3.0 g/dL Final   • BUN/Creatinine Ratio 06/11/2018 15.8  7.0 - 25.0 Final   • Anion Gap 06/11/2018 8.0  5.0 - 15.0 mmol/L Final   • Hemoglobin A1C 06/11/2018 5.8* 4 - 5.6 % Final   • Total Cholesterol 06/11/2018 235* 150 - 200 mg/dL Final   • Triglycerides 06/11/2018 161* 35 - 160 mg/dL Final   • HDL Cholesterol 06/11/2018 33* 35 - 100 mg/dL Final   • LDL Cholesterol  06/11/2018 170  mg/dL  Final   • VLDL Cholesterol 06/11/2018 32.2  mg/dL Final   • LDL/HDL Ratio 06/11/2018 5.15   Final   • TSH 06/11/2018 1.590  0.460 - 4.680 mIU/mL Final   • PSA 06/11/2018 0.829  0.000 - 4.000 ng/mL Final   • Uric Acid 06/11/2018 8.2* 2.6 - 7.2 mg/dL Final   ]

## 2018-07-03 DIAGNOSIS — R55 SYNCOPE, UNSPECIFIED SYNCOPE TYPE: Primary | ICD-10-CM

## 2018-08-10 RX ORDER — ALLOPURINOL 100 MG/1
100 TABLET ORAL DAILY
Qty: 14 TABLET | Refills: 0 | Status: SHIPPED | OUTPATIENT
Start: 2018-08-10 | End: 2018-12-13 | Stop reason: SDUPTHER

## 2018-12-13 RX ORDER — ALLOPURINOL 100 MG/1
TABLET ORAL
Qty: 14 TABLET | Refills: 0 | Status: SHIPPED | OUTPATIENT
Start: 2018-12-13 | End: 2018-12-14 | Stop reason: SDUPTHER

## 2018-12-14 RX ORDER — ALLOPURINOL 100 MG/1
100 TABLET ORAL DAILY
Qty: 90 TABLET | Refills: 0 | Status: SHIPPED | OUTPATIENT
Start: 2018-12-14 | End: 2020-01-08

## 2018-12-14 RX ORDER — ALLOPURINOL 300 MG/1
300 TABLET ORAL DAILY
Qty: 30 TABLET | Refills: 3 | Status: SHIPPED | OUTPATIENT
Start: 2018-12-14 | End: 2018-12-14

## 2018-12-27 ENCOUNTER — LAB (OUTPATIENT)
Dept: LAB | Facility: OTHER | Age: 63
End: 2018-12-27

## 2018-12-27 DIAGNOSIS — I10 ESSENTIAL HYPERTENSION: Chronic | ICD-10-CM

## 2018-12-27 DIAGNOSIS — M1A.9XX0 CHRONIC GOUT WITHOUT TOPHUS, UNSPECIFIED CAUSE, UNSPECIFIED SITE: Chronic | ICD-10-CM

## 2018-12-27 DIAGNOSIS — R73.01 IMPAIRED FASTING GLUCOSE: Chronic | ICD-10-CM

## 2018-12-27 DIAGNOSIS — E78.2 MIXED HYPERLIPIDEMIA: Chronic | ICD-10-CM

## 2018-12-27 LAB
ALBUMIN SERPL-MCNC: 4.4 G/DL (ref 3.5–5)
ALBUMIN/GLOB SERPL: 1.8 G/DL (ref 1.1–1.8)
ALP SERPL-CCNC: 72 U/L (ref 38–126)
ALT SERPL W P-5'-P-CCNC: 37 U/L
ANION GAP SERPL CALCULATED.3IONS-SCNC: 6 MMOL/L (ref 5–15)
ARTICHOKE IGE QN: 96 MG/DL (ref 1–129)
AST SERPL-CCNC: 27 U/L (ref 17–59)
BASOPHILS # BLD AUTO: 0.02 10*3/MM3 (ref 0–0.2)
BASOPHILS NFR BLD AUTO: 0.3 % (ref 0–2)
BILIRUB SERPL-MCNC: 0.7 MG/DL (ref 0.2–1.3)
BUN BLD-MCNC: 23 MG/DL (ref 9–20)
BUN/CREAT SERPL: 22.3 (ref 7–25)
CALCIUM SPEC-SCNC: 9.3 MG/DL (ref 8.4–10.2)
CHLORIDE SERPL-SCNC: 105 MMOL/L (ref 98–107)
CO2 SERPL-SCNC: 30 MMOL/L (ref 22–30)
CREAT BLD-MCNC: 1.03 MG/DL (ref 0.66–1.25)
DEPRECATED RDW RBC AUTO: 40.7 FL (ref 35.1–43.9)
EOSINOPHIL # BLD AUTO: 0.2 10*3/MM3 (ref 0–0.7)
EOSINOPHIL NFR BLD AUTO: 3 % (ref 0–7)
ERYTHROCYTE [DISTWIDTH] IN BLOOD BY AUTOMATED COUNT: 12.7 % (ref 11.5–14.5)
GFR SERPL CREATININE-BSD FRML MDRD: 73 ML/MIN/1.73 (ref 49–113)
GLOBULIN UR ELPH-MCNC: 2.5 GM/DL (ref 2.3–3.5)
GLUCOSE BLD-MCNC: 117 MG/DL (ref 74–99)
HBA1C MFR BLD: 5.9 % (ref 4–5.6)
HCT VFR BLD AUTO: 44.8 % (ref 39–49)
HGB BLD-MCNC: 15 G/DL (ref 13.7–17.3)
LYMPHOCYTES # BLD AUTO: 1.77 10*3/MM3 (ref 0.6–4.2)
LYMPHOCYTES NFR BLD AUTO: 26.2 % (ref 10–50)
MCH RBC QN AUTO: 30 PG (ref 26.5–34)
MCHC RBC AUTO-ENTMCNC: 33.5 G/DL (ref 31.5–36.3)
MCV RBC AUTO: 89.6 FL (ref 80–98)
MONOCYTES # BLD AUTO: 0.54 10*3/MM3 (ref 0–0.9)
MONOCYTES NFR BLD AUTO: 8 % (ref 0–12)
NEUTROPHILS # BLD AUTO: 4.22 10*3/MM3 (ref 2–8.6)
NEUTROPHILS NFR BLD AUTO: 62.5 % (ref 37–80)
PLATELET # BLD AUTO: 278 10*3/MM3 (ref 150–450)
PMV BLD AUTO: 9.1 FL (ref 8–12)
POTASSIUM BLD-SCNC: 4.3 MMOL/L (ref 3.4–5)
PROT SERPL-MCNC: 6.9 G/DL (ref 6.3–8.2)
RBC # BLD AUTO: 5 10*6/MM3 (ref 4.37–5.74)
SODIUM BLD-SCNC: 141 MMOL/L (ref 137–145)
URATE SERPL-MCNC: 6.7 MG/DL (ref 3.5–8.5)
WBC NRBC COR # BLD: 6.75 10*3/MM3 (ref 3.2–9.8)

## 2018-12-27 PROCEDURE — 80053 COMPREHEN METABOLIC PANEL: CPT | Performed by: INTERNAL MEDICINE

## 2018-12-27 PROCEDURE — 85025 COMPLETE CBC W/AUTO DIFF WBC: CPT | Performed by: INTERNAL MEDICINE

## 2018-12-27 PROCEDURE — 83036 HEMOGLOBIN GLYCOSYLATED A1C: CPT | Performed by: INTERNAL MEDICINE

## 2018-12-27 PROCEDURE — 36415 COLL VENOUS BLD VENIPUNCTURE: CPT | Performed by: INTERNAL MEDICINE

## 2018-12-27 PROCEDURE — 84550 ASSAY OF BLOOD/URIC ACID: CPT | Performed by: INTERNAL MEDICINE

## 2018-12-27 PROCEDURE — 83721 ASSAY OF BLOOD LIPOPROTEIN: CPT | Performed by: INTERNAL MEDICINE

## 2019-01-11 ENCOUNTER — OFFICE VISIT (OUTPATIENT)
Dept: FAMILY MEDICINE CLINIC | Facility: CLINIC | Age: 64
End: 2019-01-11

## 2019-01-11 VITALS
DIASTOLIC BLOOD PRESSURE: 80 MMHG | HEART RATE: 60 BPM | TEMPERATURE: 98.3 F | WEIGHT: 173.4 LBS | BODY MASS INDEX: 26.28 KG/M2 | SYSTOLIC BLOOD PRESSURE: 138 MMHG | HEIGHT: 68 IN

## 2019-01-11 DIAGNOSIS — J06.9 VIRAL URI WITH COUGH: ICD-10-CM

## 2019-01-11 DIAGNOSIS — Z77.22 SECONDHAND SMOKE EXPOSURE: Chronic | ICD-10-CM

## 2019-01-11 DIAGNOSIS — M1A.9XX0 CHRONIC GOUT WITHOUT TOPHUS, UNSPECIFIED CAUSE, UNSPECIFIED SITE: Chronic | ICD-10-CM

## 2019-01-11 DIAGNOSIS — Z86.010 HISTORY OF COLON POLYPS: Chronic | ICD-10-CM

## 2019-01-11 DIAGNOSIS — I10 ESSENTIAL HYPERTENSION: Chronic | ICD-10-CM

## 2019-01-11 DIAGNOSIS — R73.01 IMPAIRED FASTING GLUCOSE: Chronic | ICD-10-CM

## 2019-01-11 DIAGNOSIS — Z12.5 SPECIAL SCREENING FOR MALIGNANT NEOPLASM OF PROSTATE: ICD-10-CM

## 2019-01-11 DIAGNOSIS — E78.2 MIXED HYPERLIPIDEMIA: Primary | Chronic | ICD-10-CM

## 2019-01-11 PROCEDURE — 96372 THER/PROPH/DIAG INJ SC/IM: CPT | Performed by: INTERNAL MEDICINE

## 2019-01-11 PROCEDURE — 99214 OFFICE O/P EST MOD 30 MIN: CPT | Performed by: INTERNAL MEDICINE

## 2019-01-11 RX ORDER — PHENYLEPHRINE HCL 10 MG/1
TABLET, FILM COATED ORAL
Qty: 36 TABLET | Refills: 0 | Status: SHIPPED | OUTPATIENT
Start: 2019-01-11 | End: 2020-01-16 | Stop reason: SDUPTHER

## 2019-01-11 RX ORDER — FLUTICASONE PROPIONATE 50 MCG
1 SPRAY, SUSPENSION (ML) NASAL 2 TIMES DAILY
Qty: 1 BOTTLE | Refills: 11 | Status: SHIPPED | OUTPATIENT
Start: 2019-01-11 | End: 2020-01-16 | Stop reason: SDUPTHER

## 2019-01-11 RX ORDER — METHYLPREDNISOLONE ACETATE 80 MG/ML
80 INJECTION, SUSPENSION INTRA-ARTICULAR; INTRALESIONAL; INTRAMUSCULAR; SOFT TISSUE ONCE
Status: COMPLETED | OUTPATIENT
Start: 2019-01-11 | End: 2019-01-11

## 2019-01-11 RX ORDER — TRIAMCINOLONE ACETONIDE 40 MG/ML
20 INJECTION, SUSPENSION INTRA-ARTICULAR; INTRAMUSCULAR ONCE
Status: COMPLETED | OUTPATIENT
Start: 2019-01-11 | End: 2019-01-11

## 2019-01-11 RX ADMIN — METHYLPREDNISOLONE ACETATE 80 MG: 80 INJECTION, SUSPENSION INTRA-ARTICULAR; INTRALESIONAL; INTRAMUSCULAR; SOFT TISSUE at 09:54

## 2019-01-11 RX ADMIN — TRIAMCINOLONE ACETONIDE 20 MG: 40 INJECTION, SUSPENSION INTRA-ARTICULAR; INTRAMUSCULAR at 09:55

## 2019-01-11 NOTE — PROGRESS NOTES
Subjective        History of Present Illness     Don Oscar is a 63 y.o. male who established care last summer.  He is here with his wife, Beba.  They live in [facility] and enjoy camping.  He likes to sing and plays a keyboard and guitar, playing with a band sometimes.  He denies tobacco use. He smoked for about 7 years, stopping in his early twenties.  He continues to get secondhand smoke with his wife and stepson smoke in the home     At his visit six months ago, we started losartan 50 mg daily.  Blood pressure is improved from last visit at 138/80.     He was evaluated by Dr. Jane after a syncopal episode, which was felt to possibly be due to heat exhaustion.  He checked out fine from a cardiology standpoint.     He reports 4-5 day history of increased postnasal drainage and episodic cough.  He had left-sided earache a few days ago, but this is starting to improve.  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        He has history of gout.  When he was here six months ago, he was prescribed allopurinol 400 mg to take when he was having acute gout flares, but had never taken it daily as prophylaxis.  I had him start daily allopurinol 100 mg.  He reports only one mild gout flare a couple of months ago and uric acid is improved at 6.7. Renal function is also improved.     Last year, we started him on Lipitor when lipid panel was not at goal.   LDL is greatly improved at 96 decreased from 170 six months ago.     Colonoscopy in 2011 by Dr. Hayden revealed one polyp.  I encouraged repeat colonoscopy at his visit six months ago.  However, patient wanted to delay this until this visit despite being made aware of risks of delaying procedure.      The patient's relevant past medical, surgical, and social history was reviewed in Epic.   Lab results are reviewed with the patient today. CBC unremarkable.  Fasting glucose 117. A1c is 5.9. Renal and liver function normal.  LDL 96 with  "adding Lipitor. Uric acid 6.7.       Review of Systems   Constitutional: Negative for chills, fatigue and fever.   HENT: Negative for congestion, ear pain, postnasal drip, sinus pressure and sore throat.    Respiratory: Negative for cough, shortness of breath and wheezing.    Cardiovascular: Negative for chest pain, palpitations and leg swelling.   Gastrointestinal: Negative for abdominal pain, blood in stool, constipation, diarrhea, nausea and vomiting.   Endocrine: Negative for cold intolerance, heat intolerance, polydipsia and polyuria.   Genitourinary: Negative for dysuria, frequency, hematuria and urgency.   Skin: Negative for rash.   Neurological: Negative for syncope and weakness.        Objective     Vitals:    01/11/19 0854   BP: 138/80   Pulse: 60   Temp: 98.3 °F (36.8 °C)   TempSrc: Oral   Weight: 78.7 kg (173 lb 6.4 oz)   Height: 172.7 cm (68\")     Physical Exam   Constitutional: He is oriented to person, place, and time. He appears well-developed and well-nourished. No distress.   HENT:   Head: Normocephalic and atraumatic.   Nose: Right sinus exhibits no maxillary sinus tenderness and no frontal sinus tenderness. Left sinus exhibits no maxillary sinus tenderness and no frontal sinus tenderness.   Mouth/Throat: Uvula is midline, oropharynx is clear and moist and mucous membranes are normal. No oral lesions. No tonsillar exudate.   Bilateral ear effusions with left TM somewhat injected.  .  Prominent clear postnasal drip and nasal congestion.         Eyes: Conjunctivae and EOM are normal. Pupils are equal, round, and reactive to light.   Neck: Trachea normal. Neck supple. No JVD present. Carotid bruit is not present. No tracheal deviation present. No thyroid mass and no thyromegaly present.   Cardiovascular: Normal rate, regular rhythm, normal heart sounds and intact distal pulses.  No extrasystoles are present. PMI is not displaced.   No murmur heard.  2/6 systolic murmur heard at left sternal border  "   Pulmonary/Chest: Effort normal and breath sounds normal. No accessory muscle usage. No respiratory distress. He has no decreased breath sounds. He has no wheezes. He has no rhonchi. He has no rales.   Chronic lung sounds.     Abdominal: Soft. Bowel sounds are normal. He exhibits no distension. There is no hepatosplenomegaly. There is no tenderness.     Vascular Status -  His right foot exhibits normal foot vasculature  and no edema. His left foot exhibits normal foot vasculature  and no edema.  Lymphadenopathy:     He has no cervical adenopathy.   Neurological: He is alert and oriented to person, place, and time. No cranial nerve deficit. Coordination normal.   Skin: Skin is warm, dry and intact. No rash noted. No cyanosis. Nails show no clubbing.   Psychiatric: He has a normal mood and affect. His speech is normal and behavior is normal. Judgment and thought content normal.   Vitals reviewed.        Assessment/Plan      For the viral URI, a prescription is sent for Flonase nasal spray one spray each nostril b.i.d. and phenylephrine 10 mg to take 1 po 2-3 times daily prn congestion.   Recommended OTC Claritin (loratadine) 10 mg one daily until symptoms improve.  After informed verbal consent, patient is given Kenalog 20 mg and Depo-Medrol 80 mg IM without difficulty or complications.  Patient tolerated well without complications.       Refer to Dr. Hayden for colonoscopy due to history of colon polyps.     Continue the losartan 50 mg to take one tablet daily.  Monitor blood pressure.       Colonoscopy in 2011 by Dr. Hayden revealed one polyp.  I encouraged repeat colonoscopy.       Continue allopurinol 100 mg daily for gout prophylaxis.      Continue Lipitor 10 mg daily and dietary efforts.     Continue other medications and vitamin and mineral supplements to treat additional medical problems which we addressed today    Return in six months for follow up with fasting labs one week prior.      Scribed for   Khushbu by Ceci Hensley [facility].     Diagnoses and all orders for this visit:    Mixed hyperlipidemia  -     CBC Auto Differential; Future  -     Comprehensive Metabolic Panel; Future  -     Lipid Panel; Future  -     TSH; Future    Essential hypertension  -     CBC Auto Differential; Future  -     Comprehensive Metabolic Panel; Future    Impaired fasting glucose  -     Hemoglobin A1c; Future    Chronic gout without tophus, unspecified cause, unspecified site  -     Uric Acid; Future    Secondhand smoke exposure    Viral URI with cough  -     methylPREDNISolone acetate (DEPO-medrol) injection 80 mg; Inject 1 mL into the appropriate muscle as directed by prescriber 1 (One) Time.  -     triamcinolone acetonide (KENALOG-40) injection 20 mg; Inject 0.5 mL into the appropriate muscle as directed by prescriber 1 (One) Time.    History of colon polyps  -     Ambulatory Referral to Gastroenterology    Special screening for malignant neoplasm of prostate  -     PSA Screen; Future    Other orders  -     phenylephrine (SUDAFED PE) 10 MG tablet; 1 po 2-3 times daily prn congestion  -     fluticasone (FLONASE) 50 MCG/ACT nasal spray; 1 spray into the nostril(s) as directed by provider 2 (Two) Times a Day. Administer 1 spray in each nostril twice daily.        Lab on 12/27/2018   Component Date Value Ref Range Status   • WBC 12/27/2018 6.75  3.20 - 9.80 10*3/mm3 Final   • RBC 12/27/2018 5.00  4.37 - 5.74 10*6/mm3 Final   • Hemoglobin 12/27/2018 15.0  13.7 - 17.3 g/dL Final   • Hematocrit 12/27/2018 44.8  39.0 - 49.0 % Final   • MCV 12/27/2018 89.6  80.0 - 98.0 fL Final   • MCH 12/27/2018 30.0  26.5 - 34.0 pg Final   • MCHC 12/27/2018 33.5  31.5 - 36.3 g/dL Final   • RDW 12/27/2018 12.7  11.5 - 14.5 % Final   • RDW-SD 12/27/2018 40.7  35.1 - 43.9 fl Final   • MPV 12/27/2018 9.1  8.0 - 12.0 fL Final   • Platelets 12/27/2018 278  150 - 450 10*3/mm3 Final   • Neutrophil % 12/27/2018 62.5  37.0 - 80.0 % Final   • Lymphocyte %  12/27/2018 26.2  10.0 - 50.0 % Final   • Monocyte % 12/27/2018 8.0  0.0 - 12.0 % Final   • Eosinophil % 12/27/2018 3.0  0.0 - 7.0 % Final   • Basophil % 12/27/2018 0.3  0.0 - 2.0 % Final   • Neutrophils, Absolute 12/27/2018 4.22  2.00 - 8.60 10*3/mm3 Final   • Lymphocytes, Absolute 12/27/2018 1.77  0.60 - 4.20 10*3/mm3 Final   • Monocytes, Absolute 12/27/2018 0.54  0.00 - 0.90 10*3/mm3 Final   • Eosinophils, Absolute 12/27/2018 0.20  0.00 - 0.70 10*3/mm3 Final   • Basophils, Absolute 12/27/2018 0.02  0.00 - 0.20 10*3/mm3 Final   • Glucose 12/27/2018 117* 74 - 99 mg/dL Final   • BUN 12/27/2018 23* 9 - 20 mg/dL Final   • Creatinine 12/27/2018 1.03  0.66 - 1.25 mg/dL Final   • Sodium 12/27/2018 141  137 - 145 mmol/L Final   • Potassium 12/27/2018 4.3  3.4 - 5.0 mmol/L Final   • Chloride 12/27/2018 105  98 - 107 mmol/L Final   • CO2 12/27/2018 30.0  22.0 - 30.0 mmol/L Final   • Calcium 12/27/2018 9.3  8.4 - 10.2 mg/dL Final   • Total Protein 12/27/2018 6.9  6.3 - 8.2 g/dL Final   • Albumin 12/27/2018 4.40  3.50 - 5.00 g/dL Final   • ALT (SGPT) 12/27/2018 37  <=50 U/L Final   • AST (SGOT) 12/27/2018 27  17 - 59 U/L Final   • Alkaline Phosphatase 12/27/2018 72  38 - 126 U/L Final   • Total Bilirubin 12/27/2018 0.7  0.2 - 1.3 mg/dL Final   • eGFR Non  Amer 12/27/2018 73  49 - 113 mL/min/1.73 Final   • Globulin 12/27/2018 2.5  2.3 - 3.5 gm/dL Final   • A/G Ratio 12/27/2018 1.8  1.1 - 1.8 g/dL Final   • BUN/Creatinine Ratio 12/27/2018 22.3  7.0 - 25.0 Final   • Anion Gap 12/27/2018 6.0  5.0 - 15.0 mmol/L Final   • Hemoglobin A1C 12/27/2018 5.9* 4 - 5.6 % Final   • LDL Cholesterol  12/27/2018 96  1 - 129 mg/dL Final   • Uric Acid 12/27/2018 6.7  3.5 - 8.5 mg/dL Final   ]

## 2019-03-04 ENCOUNTER — TELEPHONE (OUTPATIENT)
Dept: FAMILY MEDICINE CLINIC | Facility: CLINIC | Age: 64
End: 2019-03-04

## 2019-03-04 NOTE — TELEPHONE ENCOUNTER
03/04/2019  I spoke to pharmacist at [facility] and they have other Losartan to offer. They will give this to the patient. TP      ----- Message from Sheela Hooper RN sent at 3/4/2019 11:53 AM CST -----  I called the patient and they took med to the pharmacy and his batch of Losartan was recalled. Request a replacement. Please advise. TP  ----- Message -----  From: Esther Leonardo  Sent: 3/4/2019   8:57 AM  To: Sheela Hooper RN    Cozaar has been recalled.  Please call spouse regarding change of medication [phone]

## 2019-05-28 RX ORDER — LOSARTAN POTASSIUM 50 MG/1
TABLET ORAL
Qty: 90 TABLET | Refills: 3 | Status: SHIPPED | OUTPATIENT
Start: 2019-05-28 | End: 2019-07-15 | Stop reason: DRUGHIGH

## 2019-06-11 RX ORDER — ATORVASTATIN CALCIUM 10 MG/1
10 TABLET, FILM COATED ORAL DAILY
Qty: 30 TABLET | Refills: 11 | Status: SHIPPED | OUTPATIENT
Start: 2019-06-11 | End: 2020-08-26

## 2019-07-09 ENCOUNTER — LAB (OUTPATIENT)
Dept: LAB | Facility: OTHER | Age: 64
End: 2019-07-09

## 2019-07-09 DIAGNOSIS — R73.01 IMPAIRED FASTING GLUCOSE: Chronic | ICD-10-CM

## 2019-07-09 DIAGNOSIS — Z12.5 SPECIAL SCREENING FOR MALIGNANT NEOPLASM OF PROSTATE: ICD-10-CM

## 2019-07-09 DIAGNOSIS — I10 ESSENTIAL HYPERTENSION: Chronic | ICD-10-CM

## 2019-07-09 DIAGNOSIS — M1A.9XX0 CHRONIC GOUT WITHOUT TOPHUS, UNSPECIFIED CAUSE, UNSPECIFIED SITE: Chronic | ICD-10-CM

## 2019-07-09 DIAGNOSIS — E78.2 MIXED HYPERLIPIDEMIA: Chronic | ICD-10-CM

## 2019-07-09 LAB
ALBUMIN SERPL-MCNC: 3.9 G/DL (ref 3.5–5)
ALBUMIN/GLOB SERPL: 1.4 G/DL (ref 1.1–1.8)
ALP SERPL-CCNC: 74 U/L (ref 38–126)
ALT SERPL W P-5'-P-CCNC: 33 U/L
ANION GAP SERPL CALCULATED.3IONS-SCNC: 11 MMOL/L (ref 5–15)
AST SERPL-CCNC: 27 U/L (ref 17–59)
BASOPHILS # BLD AUTO: 0 10*3/MM3 (ref 0–0.2)
BASOPHILS NFR BLD AUTO: 0 % (ref 0–1.5)
BILIRUB SERPL-MCNC: 0.6 MG/DL (ref 0.2–1.3)
BUN BLD-MCNC: 23 MG/DL (ref 7–23)
BUN/CREAT SERPL: 19.7 (ref 7–25)
CALCIUM SPEC-SCNC: 9.1 MG/DL (ref 8.4–10.2)
CHLORIDE SERPL-SCNC: 107 MMOL/L (ref 101–112)
CHOLEST SERPL-MCNC: 166 MG/DL (ref 150–200)
CO2 SERPL-SCNC: 25 MMOL/L (ref 22–30)
CREAT BLD-MCNC: 1.17 MG/DL (ref 0.7–1.3)
DEPRECATED RDW RBC AUTO: 39.9 FL (ref 37–54)
EOSINOPHIL # BLD AUTO: 0.15 10*3/MM3 (ref 0–0.4)
EOSINOPHIL NFR BLD AUTO: 1.8 % (ref 0.3–6.2)
ERYTHROCYTE [DISTWIDTH] IN BLOOD BY AUTOMATED COUNT: 12.4 % (ref 12.3–15.4)
GFR SERPL CREATININE-BSD FRML MDRD: 63 ML/MIN/1.73 (ref 49–113)
GLOBULIN UR ELPH-MCNC: 2.8 GM/DL (ref 2.3–3.5)
GLUCOSE BLD-MCNC: 117 MG/DL (ref 70–99)
HBA1C MFR BLD: 5.74 % (ref 4.8–5.6)
HCT VFR BLD AUTO: 42.5 % (ref 37.5–51)
HDLC SERPL-MCNC: 30 MG/DL (ref 40–59)
HGB BLD-MCNC: 14.7 G/DL (ref 13–17.7)
LDLC SERPL CALC-MCNC: 119 MG/DL
LDLC/HDLC SERPL: 3.95 {RATIO} (ref 0–3.55)
LYMPHOCYTES # BLD AUTO: 1.68 10*3/MM3 (ref 0.7–3.1)
LYMPHOCYTES NFR BLD AUTO: 20.2 % (ref 19.6–45.3)
MCH RBC QN AUTO: 31.2 PG (ref 26.6–33)
MCHC RBC AUTO-ENTMCNC: 34.6 G/DL (ref 31.5–35.7)
MCV RBC AUTO: 90.2 FL (ref 79–97)
MONOCYTES # BLD AUTO: 0.76 10*3/MM3 (ref 0.1–0.9)
MONOCYTES NFR BLD AUTO: 9.1 % (ref 5–12)
NEUTROPHILS # BLD AUTO: 5.74 10*3/MM3 (ref 1.7–7)
NEUTROPHILS NFR BLD AUTO: 68.9 % (ref 42.7–76)
PLATELET # BLD AUTO: 246 10*3/MM3 (ref 140–450)
PMV BLD AUTO: 8.8 FL (ref 6–12)
POTASSIUM BLD-SCNC: 4 MMOL/L (ref 3.4–5)
PROT SERPL-MCNC: 6.7 G/DL (ref 6.3–8.6)
PSA SERPL-MCNC: 0.58 NG/ML (ref 0–4)
RBC # BLD AUTO: 4.71 10*6/MM3 (ref 4.14–5.8)
SODIUM BLD-SCNC: 143 MMOL/L (ref 137–145)
TRIGL SERPL-MCNC: 87 MG/DL
TSH SERPL DL<=0.05 MIU/L-ACNC: 1.02 MIU/ML (ref 0.27–4.2)
URATE SERPL-MCNC: 9.4 MG/DL (ref 3.5–8.5)
VLDLC SERPL-MCNC: 17.4 MG/DL
WBC NRBC COR # BLD: 8.33 10*3/MM3 (ref 3.4–10.8)

## 2019-07-09 PROCEDURE — 80053 COMPREHEN METABOLIC PANEL: CPT | Performed by: INTERNAL MEDICINE

## 2019-07-09 PROCEDURE — 84550 ASSAY OF BLOOD/URIC ACID: CPT | Performed by: INTERNAL MEDICINE

## 2019-07-09 PROCEDURE — 36415 COLL VENOUS BLD VENIPUNCTURE: CPT | Performed by: INTERNAL MEDICINE

## 2019-07-09 PROCEDURE — 84443 ASSAY THYROID STIM HORMONE: CPT | Performed by: INTERNAL MEDICINE

## 2019-07-09 PROCEDURE — 80061 LIPID PANEL: CPT | Performed by: INTERNAL MEDICINE

## 2019-07-09 PROCEDURE — 85025 COMPLETE CBC W/AUTO DIFF WBC: CPT | Performed by: INTERNAL MEDICINE

## 2019-07-09 PROCEDURE — 83036 HEMOGLOBIN GLYCOSYLATED A1C: CPT | Performed by: INTERNAL MEDICINE

## 2019-07-09 PROCEDURE — G0103 PSA SCREENING: HCPCS | Performed by: INTERNAL MEDICINE

## 2019-07-15 ENCOUNTER — OFFICE VISIT (OUTPATIENT)
Dept: FAMILY MEDICINE CLINIC | Facility: CLINIC | Age: 64
End: 2019-07-15

## 2019-07-15 VITALS
WEIGHT: 173 LBS | BODY MASS INDEX: 26.22 KG/M2 | DIASTOLIC BLOOD PRESSURE: 80 MMHG | HEART RATE: 52 BPM | HEIGHT: 68 IN | TEMPERATURE: 98.2 F | SYSTOLIC BLOOD PRESSURE: 164 MMHG

## 2019-07-15 DIAGNOSIS — M1A.9XX0 CHRONIC GOUT WITHOUT TOPHUS, UNSPECIFIED CAUSE, UNSPECIFIED SITE: Chronic | ICD-10-CM

## 2019-07-15 DIAGNOSIS — Z86.010 HISTORY OF COLON POLYPS: Chronic | ICD-10-CM

## 2019-07-15 DIAGNOSIS — R73.01 IMPAIRED FASTING GLUCOSE: Chronic | ICD-10-CM

## 2019-07-15 DIAGNOSIS — E78.2 MIXED HYPERLIPIDEMIA: Chronic | ICD-10-CM

## 2019-07-15 DIAGNOSIS — Z77.22 SECONDHAND SMOKE EXPOSURE: Chronic | ICD-10-CM

## 2019-07-15 DIAGNOSIS — I10 ESSENTIAL HYPERTENSION: Primary | Chronic | ICD-10-CM

## 2019-07-15 DIAGNOSIS — Z12.5 SPECIAL SCREENING FOR MALIGNANT NEOPLASM OF PROSTATE: ICD-10-CM

## 2019-07-15 PROCEDURE — 99214 OFFICE O/P EST MOD 30 MIN: CPT | Performed by: INTERNAL MEDICINE

## 2019-07-15 RX ORDER — LOSARTAN POTASSIUM 100 MG/1
100 TABLET ORAL DAILY
Qty: 90 TABLET | Refills: 3 | Status: SHIPPED | OUTPATIENT
Start: 2019-07-15 | End: 2020-04-08 | Stop reason: SDUPTHER

## 2019-07-15 NOTE — PATIENT INSTRUCTIONS

## 2019-07-15 NOTE — PROGRESS NOTES
Subjective     History of Present Illness     Don Oscar is a 64 y.o. male who established care last summer and comes in today for a 6-month follow up on mixed hyperlipidemia, essential hypertension, impaired fasting glucose, and gout among other issues. He and his wife live in [facility] and enjoy camping.  He works through the [employer] in [place]. He likes to sing and plays a keyboard and guitar, playing with a band sometimes.  He reports a couple of mild gout flares since his last visit.  Uric acic is 9.4, but in discussion, he has been taking the allopurinol with symptoms instead of daily.   Denies chest pain.       He denies tobacco use. He smoked for about 7 years, stopping in his early twenties.  He continues to get secondhand smoke with his wife and stepson smoking in the home.  I recommended he ask them to smoke outside the house to prevent secondhand exposure.     He sustained trauma to the left eye when a a drill bit broke off hitting his left eye.  It was painful last night, but he reports pain has mostly resolved this morning.  Exam of left eye reveals injection of sclera with no evidence of corneal abrasion or foreign body.  He has been putting Visine in the eye.           Weight is stable.  Blood pressure is above goal at 164/80 with losartan 50 mg.  Heart rate at goal.      The patient's relevant past medical, surgical, and social history was reviewed in Epic.   Lab results are reviewed with the patient today. CBC unremarkable.  PSA normal limits.  Total cholesterol 166.  HDL 30.  .  Triglycerides 87. LDL/HDL ratio 3.95.  Normal liver and renal function.       Review of Systems   Constitutional: Negative for chills, fatigue and fever.   HENT: Negative for congestion, ear pain, postnasal drip, sinus pressure and sore throat.    Respiratory: Negative for cough, shortness of breath and wheezing.    Cardiovascular: Negative for chest pain, palpitations and leg swelling.  "  Gastrointestinal: Negative for abdominal pain, blood in stool, constipation, diarrhea, nausea and vomiting.   Endocrine: Negative for cold intolerance, heat intolerance, polydipsia and polyuria.   Genitourinary: Negative for dysuria, frequency, hematuria and urgency.   Skin: Negative for rash.   Neurological: Negative for syncope and weakness.       Objective     Vitals:    07/15/19 0952   BP: 164/80   Pulse: 52   Temp: 98.2 °F (36.8 °C)   TempSrc: Oral   Weight: 78.5 kg (173 lb)   Height: 172.7 cm (68\")     Physical Exam   Constitutional: He is oriented to person, place, and time. He appears well-developed and well-nourished. No distress.   HENT:   Head: Normocephalic and atraumatic.   Nose: Right sinus exhibits no maxillary sinus tenderness and no frontal sinus tenderness. Left sinus exhibits no maxillary sinus tenderness and no frontal sinus tenderness.   Mouth/Throat: Uvula is midline, oropharynx is clear and moist and mucous membranes are normal. No oral lesions. No tonsillar exudate.   Eyes: Conjunctivae and EOM are normal. Pupils are equal, round, and reactive to light.   Exam of left eye reveals injection of sclera with no evidence of corneal abrasion or foreign body.   Neck: Trachea normal. Neck supple. No JVD present. Carotid bruit is not present. No tracheal deviation present. No thyroid mass and no thyromegaly present.   Cardiovascular: Normal rate, regular rhythm, normal heart sounds and intact distal pulses.  No extrasystoles are present. PMI is not displaced.   No murmur heard.  2/6 systolic murmur heard at left sternal border     Pulmonary/Chest: Effort normal and breath sounds normal. No accessory muscle usage. No respiratory distress. He has no decreased breath sounds. He has no wheezes. He has no rhonchi. He has no rales.   Chronic lung sounds.    Abdominal: Soft. Bowel sounds are normal. He exhibits no distension. There is no hepatosplenomegaly. There is no tenderness.     Vascular Status -  " His right foot exhibits normal foot vasculature  and no edema. His left foot exhibits normal foot vasculature  and no edema.  Lymphadenopathy:     He has no cervical adenopathy.   Neurological: He is alert and oriented to person, place, and time. No cranial nerve deficit. Coordination normal.   Skin: Skin is warm, dry and intact. No rash noted. No cyanosis. Nails show no clubbing.   Psychiatric: He has a normal mood and affect. His speech is normal and behavior is normal. Judgment and thought content normal.   Vitals reviewed.          Assessment/Plan      Increase the losartan to 100 mg daily.  Monitor blood pressure and notify me if consistently above goal.     Continue to apply the Visine for a couple more days.       Recommended daily use of allopurinol 100 mg daily for gout prophylaxis.      Continue Lipitor 10 mg daily and dietary efforts.     We will plan on giving pneumonia vaccines when he turns age 65 next winter.     Continue other medications and vitamin and mineral supplements to treat additional medical problems which we addressed today.  Return in six months for follow up with fasting labs one week prior.     Scribed for Dr. Ríos by Ceci Hensley [facility].     Diagnoses and all orders for this visit:    Essential hypertension  -     CBC Auto Differential; Future  -     Comprehensive Metabolic Panel; Future  -     Lipid Panel; Future    Mixed hyperlipidemia  -     Lipid Panel; Future    Impaired fasting glucose  -     Hemoglobin A1c; Future    Chronic gout without tophus, unspecified cause, unspecified site  -     Uric Acid; Future    History of colon polyps    Secondhand smoke exposure    Special screening for malignant neoplasm of prostate  -     PSA Screen; Future    Other orders  -     losartan (COZAAR) 100 MG tablet; Take 1 tablet by mouth Daily.        Lab on 07/09/2019   Component Date Value Ref Range Status   • WBC 07/09/2019 8.33  3.40 - 10.80 10*3/mm3 Final   • RBC 07/09/2019 4.71  4.14 -  5.80 10*6/mm3 Final   • Hemoglobin 07/09/2019 14.7  13.0 - 17.7 g/dL Final   • Hematocrit 07/09/2019 42.5  37.5 - 51.0 % Final   • MCV 07/09/2019 90.2  79.0 - 97.0 fL Final   • MCH 07/09/2019 31.2  26.6 - 33.0 pg Final   • MCHC 07/09/2019 34.6  31.5 - 35.7 g/dL Final   • RDW 07/09/2019 12.4  12.3 - 15.4 % Final   • RDW-SD 07/09/2019 39.9  37.0 - 54.0 fl Final   • MPV 07/09/2019 8.8  6.0 - 12.0 fL Final   • Platelets 07/09/2019 246  140 - 450 10*3/mm3 Final   • Neutrophil % 07/09/2019 68.9  42.7 - 76.0 % Final   • Lymphocyte % 07/09/2019 20.2  19.6 - 45.3 % Final   • Monocyte % 07/09/2019 9.1  5.0 - 12.0 % Final   • Eosinophil % 07/09/2019 1.8  0.3 - 6.2 % Final   • Basophil % 07/09/2019 0.0  0.0 - 1.5 % Final   • Neutrophils, Absolute 07/09/2019 5.74  1.70 - 7.00 10*3/mm3 Final   • Lymphocytes, Absolute 07/09/2019 1.68  0.70 - 3.10 10*3/mm3 Final   • Monocytes, Absolute 07/09/2019 0.76  0.10 - 0.90 10*3/mm3 Final   • Eosinophils, Absolute 07/09/2019 0.15  0.00 - 0.40 10*3/mm3 Final   • Basophils, Absolute 07/09/2019 0.00  0.00 - 0.20 10*3/mm3 Final   • Glucose 07/09/2019 117* 70 - 99 mg/dL Final   • BUN 07/09/2019 23  7 - 23 mg/dL Final   • Creatinine 07/09/2019 1.17  0.70 - 1.30 mg/dL Final   • Sodium 07/09/2019 143  137 - 145 mmol/L Final   • Potassium 07/09/2019 4.0  3.4 - 5.0 mmol/L Final   • Chloride 07/09/2019 107  101 - 112 mmol/L Final   • CO2 07/09/2019 25.0  22.0 - 30.0 mmol/L Final   • Calcium 07/09/2019 9.1  8.4 - 10.2 mg/dL Final   • Total Protein 07/09/2019 6.7  6.3 - 8.6 g/dL Final   • Albumin 07/09/2019 3.90  3.50 - 5.00 g/dL Final   • ALT (SGPT) 07/09/2019 33  <=50 U/L Final   • AST (SGOT) 07/09/2019 27  17 - 59 U/L Final   • Alkaline Phosphatase 07/09/2019 74  38 - 126 U/L Final   • Total Bilirubin 07/09/2019 0.6  0.2 - 1.3 mg/dL Final   • eGFR Non African Amer 07/09/2019 63  49 - 113 mL/min/1.73 Final   • Globulin 07/09/2019 2.8  2.3 - 3.5 gm/dL Final   • A/G Ratio 07/09/2019 1.4  1.1 - 1.8 g/dL  Final   • BUN/Creatinine Ratio 07/09/2019 19.7  7.0 - 25.0 Final   • Anion Gap 07/09/2019 11.0  5.0 - 15.0 mmol/L Final   • Hemoglobin A1C 07/09/2019 5.74* 4.80 - 5.60 % Final   • Total Cholesterol 07/09/2019 166  150 - 200 mg/dL Final   • Triglycerides 07/09/2019 87  <=150 mg/dL Final   • HDL Cholesterol 07/09/2019 30* 40 - 59 mg/dL Final   • LDL Cholesterol  07/09/2019 119* <=100 mg/dL Final   • VLDL Cholesterol 07/09/2019 17.4  mg/dL Final   • LDL/HDL Ratio 07/09/2019 3.95* 0.00 - 3.55 Final   • TSH 07/09/2019 1.020  0.270 - 4.200 mIU/mL Final   • Uric Acid 07/09/2019 9.4* 3.5 - 8.5 mg/dL Final   • PSA 07/09/2019 0.580  0.000 - 4.000 ng/mL Final   ]

## 2020-01-06 ENCOUNTER — LAB (OUTPATIENT)
Dept: LAB | Facility: OTHER | Age: 65
End: 2020-01-06

## 2020-01-06 DIAGNOSIS — I10 ESSENTIAL HYPERTENSION: Chronic | ICD-10-CM

## 2020-01-06 DIAGNOSIS — E78.2 MIXED HYPERLIPIDEMIA: Chronic | ICD-10-CM

## 2020-01-06 DIAGNOSIS — M1A.9XX0 CHRONIC GOUT WITHOUT TOPHUS, UNSPECIFIED CAUSE, UNSPECIFIED SITE: Chronic | ICD-10-CM

## 2020-01-06 DIAGNOSIS — R73.01 IMPAIRED FASTING GLUCOSE: Chronic | ICD-10-CM

## 2020-01-06 LAB
ALBUMIN SERPL-MCNC: 4.3 G/DL (ref 3.5–5)
ALBUMIN/GLOB SERPL: 1.4 G/DL (ref 1.1–1.8)
ALP SERPL-CCNC: 81 U/L (ref 38–126)
ALT SERPL W P-5'-P-CCNC: 38 U/L
ANION GAP SERPL CALCULATED.3IONS-SCNC: 6 MMOL/L (ref 5–15)
AST SERPL-CCNC: 25 U/L (ref 17–59)
BASOPHILS # BLD AUTO: 0.02 10*3/MM3 (ref 0–0.2)
BASOPHILS NFR BLD AUTO: 0.3 % (ref 0–1.5)
BILIRUB SERPL-MCNC: 0.7 MG/DL (ref 0.2–1.3)
BUN BLD-MCNC: 19 MG/DL (ref 7–23)
BUN/CREAT SERPL: 17.8 (ref 7–25)
CALCIUM SPEC-SCNC: 9.3 MG/DL (ref 8.4–10.2)
CHLORIDE SERPL-SCNC: 101 MMOL/L (ref 101–112)
CHOLEST SERPL-MCNC: 162 MG/DL (ref 150–200)
CO2 SERPL-SCNC: 30 MMOL/L (ref 22–30)
CREAT BLD-MCNC: 1.07 MG/DL (ref 0.7–1.3)
DEPRECATED RDW RBC AUTO: 40.1 FL (ref 37–54)
EOSINOPHIL # BLD AUTO: 0.2 10*3/MM3 (ref 0–0.4)
EOSINOPHIL NFR BLD AUTO: 2.9 % (ref 0.3–6.2)
ERYTHROCYTE [DISTWIDTH] IN BLOOD BY AUTOMATED COUNT: 12.5 % (ref 12.3–15.4)
GFR SERPL CREATININE-BSD FRML MDRD: 70 ML/MIN/1.73 (ref 49–113)
GLOBULIN UR ELPH-MCNC: 3 GM/DL (ref 2.3–3.5)
GLUCOSE BLD-MCNC: 106 MG/DL (ref 70–99)
HCT VFR BLD AUTO: 43.2 % (ref 37.5–51)
HDLC SERPL-MCNC: 29 MG/DL (ref 40–59)
HGB BLD-MCNC: 14.9 G/DL (ref 13–17.7)
LDLC SERPL CALC-MCNC: 108 MG/DL
LDLC/HDLC SERPL: 3.74 {RATIO} (ref 0–3.55)
LYMPHOCYTES # BLD AUTO: 1.62 10*3/MM3 (ref 0.7–3.1)
LYMPHOCYTES NFR BLD AUTO: 23.5 % (ref 19.6–45.3)
MCH RBC QN AUTO: 30.4 PG (ref 26.6–33)
MCHC RBC AUTO-ENTMCNC: 34.5 G/DL (ref 31.5–35.7)
MCV RBC AUTO: 88.2 FL (ref 79–97)
MONOCYTES # BLD AUTO: 0.69 10*3/MM3 (ref 0.1–0.9)
MONOCYTES NFR BLD AUTO: 10 % (ref 5–12)
NEUTROPHILS # BLD AUTO: 4.36 10*3/MM3 (ref 1.7–7)
NEUTROPHILS NFR BLD AUTO: 63.3 % (ref 42.7–76)
PLATELET # BLD AUTO: 264 10*3/MM3 (ref 140–450)
PMV BLD AUTO: 8.6 FL (ref 6–12)
POTASSIUM BLD-SCNC: 4.3 MMOL/L (ref 3.4–5)
PROT SERPL-MCNC: 7.3 G/DL (ref 6.3–8.6)
RBC # BLD AUTO: 4.9 10*6/MM3 (ref 4.14–5.8)
SODIUM BLD-SCNC: 137 MMOL/L (ref 137–145)
TRIGL SERPL-MCNC: 123 MG/DL
URATE SERPL-MCNC: 8 MG/DL (ref 3.5–8.5)
VLDLC SERPL-MCNC: 24.6 MG/DL
WBC NRBC COR # BLD: 6.89 10*3/MM3 (ref 3.4–10.8)

## 2020-01-06 PROCEDURE — 83036 HEMOGLOBIN GLYCOSYLATED A1C: CPT | Performed by: INTERNAL MEDICINE

## 2020-01-06 PROCEDURE — 85025 COMPLETE CBC W/AUTO DIFF WBC: CPT | Performed by: INTERNAL MEDICINE

## 2020-01-06 PROCEDURE — 80061 LIPID PANEL: CPT | Performed by: INTERNAL MEDICINE

## 2020-01-06 PROCEDURE — 80053 COMPREHEN METABOLIC PANEL: CPT | Performed by: INTERNAL MEDICINE

## 2020-01-06 PROCEDURE — 84550 ASSAY OF BLOOD/URIC ACID: CPT | Performed by: INTERNAL MEDICINE

## 2020-01-06 PROCEDURE — 36415 COLL VENOUS BLD VENIPUNCTURE: CPT | Performed by: INTERNAL MEDICINE

## 2020-01-07 LAB — HBA1C MFR BLD: 6 % (ref 4.8–5.6)

## 2020-01-08 RX ORDER — ALLOPURINOL 100 MG/1
100 TABLET ORAL DAILY
Qty: 90 TABLET | Refills: 0 | Status: SHIPPED | OUTPATIENT
Start: 2020-01-08 | End: 2021-01-25 | Stop reason: SDUPTHER

## 2020-01-13 NOTE — PROGRESS NOTES
Subjective        History of Present Illness     Don Oscar is a 64 y.o. male who lives in [facility] with his wife.  He likes to sing and plays a keyboard and guitar, playing with a band sometimes.  He comes in for a 6-month follow up on hyperlipidemia, hypertension, and impaired fasting glucose, among other issues.     He will be due pneumonia vaccines next month when he turns age 65.  He reports that he has received 1 pneumonia vaccine at his pharmacy, but not sure which 1.  He is up to date on colonoscopy.    His blood pressure is well controlled and vastly improved today.  He is tolerating losartan well.  His weight is up 5 pounds in the past 6 months.    Six months ago, I increased his losartan to 100 mg daily after his blood pressure was running above goal.         With his last visit, I recommended daily use of allopurinol 100 mg daily for gout prophylaxis.  He reports that this has been very successful.  Only 1 mild gout attack in the past 6 months, and it resolved without treatment.    He reports 2 recent acute viral illnesses.  He developed a viral URI symptoms 6 days ago and has been seen at a walk-in clinic in [place], where they gave him a steroid shot and diagnosed him with viral URI.  He continues to have nasal congestion and postnasal drip and occasional nonproductive cough.  No fevers or chills.  No facial pain or purulent nasal discharge.  No wheezing or respiratory distress.    2 days ago, he developed symptoms of viral gastroenteritis including nausea, vomiting, and diarrhea.  Symptoms are starting to improve today.  He is still experiencing some diarrhea.  No melena.  Only mild abdominal pain, which has improved today.  No hematemesis.  No vomiting today, but still with nausea.    The patient's relevant past medical, surgical, and social history was reviewed in Epic.   Lab results are reviewed with the patient today.  CBC unremarkable. Fasting glucose 106.  A1c is 6.0.  Normal renal  "and liver function.  Total cholesterol 162.  HDL 29.  .  Triglycerides 123.  LDL/HDL ratio 3.74    Review of Systems   Constitutional: Negative for chills, fatigue and fever.   HENT: Positive for congestion, ear pain, postnasal drip, sinus pressure and sore throat. Negative for trouble swallowing.    Respiratory: Negative for cough, shortness of breath and wheezing.    Cardiovascular: Negative for chest pain, palpitations and leg swelling.   Gastrointestinal: Positive for diarrhea, nausea and vomiting. Negative for abdominal pain, blood in stool and constipation.   Endocrine: Negative for cold intolerance, heat intolerance, polydipsia and polyuria.   Genitourinary: Negative for dysuria, frequency, hematuria and urgency.   Skin: Negative for rash.   Neurological: Negative for syncope and weakness.        Objective     /70   Pulse 72   Temp 98.6 °F (37 °C) (Oral)   Ht 172.7 cm (68\")   Wt 80.7 kg (178 lb)   SpO2 96%   BMI 27.06 kg/m²     Physical Exam   Constitutional: He is oriented to person, place, and time. He appears well-developed and well-nourished. No distress.   HENT:   Head: Normocephalic and atraumatic.   Nose: Right sinus exhibits no maxillary sinus tenderness and no frontal sinus tenderness. Left sinus exhibits no maxillary sinus tenderness and no frontal sinus tenderness.   Mouth/Throat: Uvula is midline, oropharynx is clear and moist and mucous membranes are normal. No oral lesions. No tonsillar exudate.   Eyes: Pupils are equal, round, and reactive to light. Conjunctivae and EOM are normal.   Neck: Trachea normal. Neck supple. No JVD present. Carotid bruit is not present. No tracheal deviation present. No thyroid mass and no thyromegaly present.   Cardiovascular: Normal rate, regular rhythm, normal heart sounds and intact distal pulses.  No extrasystoles are present. PMI is not displaced.   No murmur heard.  Pulmonary/Chest: Effort normal and breath sounds normal. No accessory muscle " usage. No respiratory distress. He has no decreased breath sounds. He has no wheezes. He has no rhonchi. He has no rales.   Abdominal: Soft. Bowel sounds are normal. He exhibits no distension. There is no hepatosplenomegaly. There is no tenderness.     Vascular Status -  His right foot exhibits normal foot vasculature  and no edema. His left foot exhibits normal foot vasculature  and no edema.  Lymphadenopathy:     He has no cervical adenopathy.   Neurological: He is alert and oriented to person, place, and time. No cranial nerve deficit. Coordination normal.   Skin: Skin is warm, dry and intact. No rash noted. No cyanosis. Nails show no clubbing.   Psychiatric: He has a normal mood and affect. His speech is normal and behavior is normal. Judgment and thought content normal.   Vitals reviewed.          Assessment/Plan      For the new problem of viral gastroenteritis, Zofran and OTC Imodium as needed.  [place] diet for the next few days.  Push fluids.  Notify me if symptoms do not resolve.    For the new problem of viral URI, he already [facility]  I have sent a prescription for phenylephrine and Flonase to his pharmacy.  Notify me if symptoms do not resolve.    Continue losartan 100 mg daily and sodium restriction.    Continue allopurinol 100 mg daily for gout prophylaxis.    Continue Lipitor 10 mg daily and dietary efforts.    Continue to pursue a diabetic style diet to slow progression of impaired fasting glucose    We can plan on getting his appropriate pneumonia vaccines later this year after he reaches age 65, but we will need to check with his pharmacy to see which 1 they administered before age 65.    Return to clinic in 6 months with fasting labs prior.    Scribed for Dr. Ríos by Ceci Hensley [facility].     Diagnoses and all orders for this visit:    Essential hypertension  -     CBC Auto Differential; Future  -     Comprehensive Metabolic Panel;  Future    Mixed hyperlipidemia  -     Lipid Panel; Future  -     TSH; Future    Impaired fasting glucose  -     Hemoglobin A1c; Future    Chronic gout without tophus, unspecified cause, unspecified site  -     Uric Acid; Future    Viral gastroenteritis    Secondhand smoke exposure    Viral URI with cough    Special screening for malignant neoplasm of prostate  -     PSA Screen; Future    Other orders  -     ondansetron (ZOFRAN) 4 MG tablet; Take 1 tablet by mouth Every 8 (Eight) Hours As Needed for Nausea or Vomiting.  -     fluticasone (FLONASE) 50 MCG/ACT nasal spray; 1 spray into the nostril(s) as directed by provider 2 (Two) Times a Day. Administer 1 spray in each nostril twice daily.  -     phenylephrine (SUDAFED PE) 10 MG tablet; 1 po 2-3 times daily prn congestion        Lab on 01/06/2020   Component Date Value Ref Range Status   • WBC 01/06/2020 6.89  3.40 - 10.80 10*3/mm3 Final   • RBC 01/06/2020 4.90  4.14 - 5.80 10*6/mm3 Final   • Hemoglobin 01/06/2020 14.9  13.0 - 17.7 g/dL Final   • Hematocrit 01/06/2020 43.2  37.5 - 51.0 % Final   • MCV 01/06/2020 88.2  79.0 - 97.0 fL Final   • MCH 01/06/2020 30.4  26.6 - 33.0 pg Final   • MCHC 01/06/2020 34.5  31.5 - 35.7 g/dL Final   • RDW 01/06/2020 12.5  12.3 - 15.4 % Final   • RDW-SD 01/06/2020 40.1  37.0 - 54.0 fl Final   • MPV 01/06/2020 8.6  6.0 - 12.0 fL Final   • Platelets 01/06/2020 264  140 - 450 10*3/mm3 Final   • Neutrophil % 01/06/2020 63.3  42.7 - 76.0 % Final   • Lymphocyte % 01/06/2020 23.5  19.6 - 45.3 % Final   • Monocyte % 01/06/2020 10.0  5.0 - 12.0 % Final   • Eosinophil % 01/06/2020 2.9  0.3 - 6.2 % Final   • Basophil % 01/06/2020 0.3  0.0 - 1.5 % Final   • Neutrophils, Absolute 01/06/2020 4.36  1.70 - 7.00 10*3/mm3 Final   • Lymphocytes, Absolute 01/06/2020 1.62  0.70 - 3.10 10*3/mm3 Final   • Monocytes, Absolute 01/06/2020 0.69  0.10 - 0.90 10*3/mm3 Final   • Eosinophils, Absolute 01/06/2020 0.20  0.00 - 0.40 10*3/mm3 Final   • Basophils,  Absolute 01/06/2020 0.02  0.00 - 0.20 10*3/mm3 Final   • Glucose 01/06/2020 106* 70 - 99 mg/dL Final   • BUN 01/06/2020 19  7 - 23 mg/dL Final   • Creatinine 01/06/2020 1.07  0.70 - 1.30 mg/dL Final   • Sodium 01/06/2020 137  137 - 145 mmol/L Final   • Potassium 01/06/2020 4.3  3.4 - 5.0 mmol/L Final   • Chloride 01/06/2020 101  101 - 112 mmol/L Final   • CO2 01/06/2020 30.0  22.0 - 30.0 mmol/L Final   • Calcium 01/06/2020 9.3  8.4 - 10.2 mg/dL Final   • Total Protein 01/06/2020 7.3  6.3 - 8.6 g/dL Final   • Albumin 01/06/2020 4.30  3.50 - 5.00 g/dL Final   • ALT (SGPT) 01/06/2020 38  <=50 U/L Final   • AST (SGOT) 01/06/2020 25  17 - 59 U/L Final   • Alkaline Phosphatase 01/06/2020 81  38 - 126 U/L Final   • Total Bilirubin 01/06/2020 0.7  0.2 - 1.3 mg/dL Final   • eGFR Non African Amer 01/06/2020 70  49 - 113 mL/min/1.73 Final   • Globulin 01/06/2020 3.0  2.3 - 3.5 gm/dL Final   • A/G Ratio 01/06/2020 1.4  1.1 - 1.8 g/dL Final   • BUN/Creatinine Ratio 01/06/2020 17.8  7.0 - 25.0 Final   • Anion Gap 01/06/2020 6.0  5.0 - 15.0 mmol/L Final   • Hemoglobin A1C 01/06/2020 6.00* 4.80 - 5.60 % Final   • Total Cholesterol 01/06/2020 162  150 - 200 mg/dL Final   • Triglycerides 01/06/2020 123  <=150 mg/dL Final   • HDL Cholesterol 01/06/2020 29* 40 - 59 mg/dL Final   • LDL Cholesterol  01/06/2020 108* <=100 mg/dL Final   • VLDL Cholesterol 01/06/2020 24.6  mg/dL Final   • LDL/HDL Ratio 01/06/2020 3.74* 0.00 - 3.55 Final   • Uric Acid 01/06/2020 8.0  3.5 - 8.5 mg/dL Final   ]

## 2020-01-16 ENCOUNTER — OFFICE VISIT (OUTPATIENT)
Dept: FAMILY MEDICINE CLINIC | Facility: CLINIC | Age: 65
End: 2020-01-16

## 2020-01-16 VITALS
BODY MASS INDEX: 26.98 KG/M2 | WEIGHT: 178 LBS | SYSTOLIC BLOOD PRESSURE: 120 MMHG | TEMPERATURE: 98.6 F | HEART RATE: 72 BPM | DIASTOLIC BLOOD PRESSURE: 70 MMHG | OXYGEN SATURATION: 96 % | HEIGHT: 68 IN

## 2020-01-16 DIAGNOSIS — A08.4 VIRAL GASTROENTERITIS: ICD-10-CM

## 2020-01-16 DIAGNOSIS — Z12.5 SPECIAL SCREENING FOR MALIGNANT NEOPLASM OF PROSTATE: ICD-10-CM

## 2020-01-16 DIAGNOSIS — E78.2 MIXED HYPERLIPIDEMIA: Chronic | ICD-10-CM

## 2020-01-16 DIAGNOSIS — R73.01 IMPAIRED FASTING GLUCOSE: Chronic | ICD-10-CM

## 2020-01-16 DIAGNOSIS — Z77.22 SECONDHAND SMOKE EXPOSURE: Chronic | ICD-10-CM

## 2020-01-16 DIAGNOSIS — J06.9 VIRAL URI WITH COUGH: ICD-10-CM

## 2020-01-16 DIAGNOSIS — M1A.9XX0 CHRONIC GOUT WITHOUT TOPHUS, UNSPECIFIED CAUSE, UNSPECIFIED SITE: Chronic | ICD-10-CM

## 2020-01-16 DIAGNOSIS — I10 ESSENTIAL HYPERTENSION: Primary | Chronic | ICD-10-CM

## 2020-01-16 PROCEDURE — 99214 OFFICE O/P EST MOD 30 MIN: CPT | Performed by: INTERNAL MEDICINE

## 2020-01-16 RX ORDER — FLUTICASONE PROPIONATE 50 MCG
1 SPRAY, SUSPENSION (ML) NASAL 2 TIMES DAILY
Qty: 1 BOTTLE | Refills: 11 | Status: SHIPPED | OUTPATIENT
Start: 2020-01-16 | End: 2021-09-24

## 2020-01-16 RX ORDER — PHENYLEPHRINE HCL 10 MG/1
TABLET, FILM COATED ORAL
Qty: 36 TABLET | Refills: 0 | Status: SHIPPED | OUTPATIENT
Start: 2020-01-16 | End: 2020-10-30

## 2020-01-16 RX ORDER — ONDANSETRON 4 MG/1
4 TABLET, FILM COATED ORAL EVERY 8 HOURS PRN
Qty: 20 TABLET | Refills: 0 | Status: SHIPPED | OUTPATIENT
Start: 2020-01-16

## 2020-01-16 NOTE — PATIENT INSTRUCTIONS

## 2020-03-04 ENCOUNTER — OFFICE VISIT (OUTPATIENT)
Dept: FAMILY MEDICINE CLINIC | Facility: CLINIC | Age: 65
End: 2020-03-04

## 2020-03-04 VITALS
OXYGEN SATURATION: 95 % | HEIGHT: 68 IN | HEART RATE: 57 BPM | DIASTOLIC BLOOD PRESSURE: 84 MMHG | SYSTOLIC BLOOD PRESSURE: 146 MMHG | BODY MASS INDEX: 26.58 KG/M2 | WEIGHT: 175.4 LBS | TEMPERATURE: 98 F

## 2020-03-04 DIAGNOSIS — J44.1 CHRONIC OBSTRUCTIVE PULMONARY DISEASE WITH ACUTE EXACERBATION (HCC): Primary | ICD-10-CM

## 2020-03-04 DIAGNOSIS — H90.3 SENSORINEURAL HEARING LOSS (SNHL) OF BOTH EARS: Chronic | ICD-10-CM

## 2020-03-04 DIAGNOSIS — R73.01 IMPAIRED FASTING GLUCOSE: Chronic | ICD-10-CM

## 2020-03-04 DIAGNOSIS — J06.9 ACUTE URI: ICD-10-CM

## 2020-03-04 DIAGNOSIS — J04.0 LARYNGITIS: ICD-10-CM

## 2020-03-04 DIAGNOSIS — I10 ESSENTIAL HYPERTENSION: Chronic | ICD-10-CM

## 2020-03-04 DIAGNOSIS — E78.2 MIXED HYPERLIPIDEMIA: Chronic | ICD-10-CM

## 2020-03-04 PROCEDURE — 99214 OFFICE O/P EST MOD 30 MIN: CPT | Performed by: INTERNAL MEDICINE

## 2020-03-04 PROCEDURE — 96372 THER/PROPH/DIAG INJ SC/IM: CPT | Performed by: INTERNAL MEDICINE

## 2020-03-04 RX ORDER — METHYLPREDNISOLONE ACETATE 80 MG/ML
80 INJECTION, SUSPENSION INTRA-ARTICULAR; INTRALESIONAL; INTRAMUSCULAR; SOFT TISSUE ONCE
Status: COMPLETED | OUTPATIENT
Start: 2020-03-04 | End: 2020-03-04

## 2020-03-04 RX ORDER — AZITHROMYCIN 250 MG/1
TABLET, FILM COATED ORAL
Qty: 6 TABLET | Refills: 0 | Status: SHIPPED | OUTPATIENT
Start: 2020-03-04 | End: 2020-08-12

## 2020-03-04 RX ORDER — BENZONATATE 200 MG/1
200 CAPSULE ORAL 3 TIMES DAILY PRN
Qty: 30 CAPSULE | Refills: 0 | Status: SHIPPED | OUTPATIENT
Start: 2020-03-04 | End: 2020-08-12

## 2020-03-04 RX ORDER — ALBUTEROL SULFATE 90 UG/1
2 AEROSOL, METERED RESPIRATORY (INHALATION) EVERY 4 HOURS PRN
Qty: 1 INHALER | Refills: 11 | Status: SHIPPED | OUTPATIENT
Start: 2020-03-04

## 2020-03-04 RX ORDER — TRIAMCINOLONE ACETONIDE 40 MG/ML
20 INJECTION, SUSPENSION INTRA-ARTICULAR; INTRAMUSCULAR ONCE
Status: COMPLETED | OUTPATIENT
Start: 2020-03-04 | End: 2020-03-04

## 2020-03-04 RX ADMIN — TRIAMCINOLONE ACETONIDE 20 MG: 40 INJECTION, SUSPENSION INTRA-ARTICULAR; INTRAMUSCULAR at 10:09

## 2020-03-04 RX ADMIN — METHYLPREDNISOLONE ACETATE 80 MG: 80 INJECTION, SUSPENSION INTRA-ARTICULAR; INTRALESIONAL; INTRAMUSCULAR; SOFT TISSUE at 10:08

## 2020-03-04 NOTE — PROGRESS NOTES
Subjective        History of Present Illness     Don Oscar is a 65 y.o. male who is accompanied by his wife, Beba.  He has several issues to discuss.  Patient comes in reporting new problem of mostly paroxsymal nonproductive cough, occasionally productive of yellow sputum and clear nasal drainage as well as right ear pressure and laryngitis.  He had chills approximately a week ago prior to onset symptoms, but denies fever. Denies flu-like body aches.  Denies headache.  He had influenza vaccination last fall.  His wife reports similar symptoms, although, she was more congested.  He is a former smoker, although, is still exposed to secondhand smoke with his wife, who is a smoker.  He has some Mucinex and loratadine at home, but has not taken the medications.     Blood pressure is above goal today.  He does not have a blood pressure cuff at home.  I encouraged him to purchase an arm BP monitor.  He reports compliance with losartan.    He is going to receive a steroid injection today and we discussed how this will temporarily increase his glucose.  His labs have previously revealed impaired fasting glucose and we discussed appropriate diet.    He reports a new problem of some hearing loss, with an abnormal hearing screen being done at work recently.  He is asking if his decreased hearing would qualify him for disability.  I told him we can schedule with ENT if he would like.  He will notify us if he decides on ENT referral.     Review of Systems   Constitutional: Negative for chills, fatigue and fever.   HENT: Positive for ear pain, postnasal drip and voice change. Negative for congestion, sinus pressure and sore throat.    Respiratory: Positive for cough and wheezing. Negative for shortness of breath.    Cardiovascular: Negative for chest pain, palpitations and leg swelling.   Gastrointestinal: Negative for abdominal pain, blood in stool, constipation, diarrhea, nausea and vomiting.   Endocrine: Negative for  "cold intolerance, heat intolerance, polydipsia and polyuria.   Genitourinary: Negative for dysuria, frequency, hematuria and urgency.   Skin: Negative for rash.   Neurological: Negative for syncope and weakness.        Objective     Visit Vitals  /84   Pulse 57   Temp 98 °F (36.7 °C) (Oral)   Ht 172.7 cm (68\")   Wt 79.6 kg (175 lb 6.4 oz)   SpO2 95%   BMI 26.67 kg/m²     Physical Exam   Constitutional: He is oriented to person, place, and time. He appears well-developed and well-nourished. No distress.   HENT:   Head: Normocephalic and atraumatic.   Nose: Nose normal.   Mouth/Throat: Oropharynx is clear and moist. No oropharyngeal exudate.   Clear postnasal drip   Eyes: Pupils are equal, round, and reactive to light. EOM are normal.   Neck: Neck supple. No JVD present. No thyromegaly present.   Cardiovascular: Normal rate, regular rhythm and normal heart sounds.   Pulmonary/Chest: Effort normal. No accessory muscle usage. No respiratory distress. He has wheezes. He has no rales.   Diminished excursion on expiratory phase of cough.       Abdominal: Soft. He exhibits no distension.   Musculoskeletal: He exhibits no edema.   Lymphadenopathy:     He has no cervical adenopathy.   Neurological: He is alert and oriented to person, place, and time. No cranial nerve deficit.   Psychiatric: He has a normal mood and affect. His speech is normal and behavior is normal. Judgment and thought content normal.       Future Appointments   Date Time Provider Department Center   7/23/2020 10:00 AM Abel Ríos MD [facility] None       Assessment/Plan      For the new problems of acute URI and COPD exacerbation, prescriptions sent for Z-pack to take as directed, Tessalon Perles 200 mg to take one t.i.d. ror cough, and albuterol inhaler to use 2 puffs every 4 h. p.r.n.  Recommended OTC Delsym. Shake well prior to use. After informed verbal consent, patient is given Kenalog 20 mg and Depo-Medrol 80 mg IM without difficulty or " complications.  Follow a low carbohydrate diabetic style diet, particularly for the next week due to the steroids.  Patient tolerated steroid shot well without complications.   He is given a work excuse for today and tomorrow.    The abnormal hearing test suggestive of sensorineural hearing loss is a new problem.  We offered an ENT referral for further evaluation.  He will let us know when/if he desires this.    Continue the losartan.  Pursue sodium restriction.  I urged him to purchase a blood pressure cuff so that he can monitor blood pressure at home episodically.    Return in July for routine follow up with fasting labs one week prior or sooner if needed.     Scribed for Dr. Ríos by Ceci Hensley [facility].     Diagnoses and all orders for this visit:    Chronic obstructive pulmonary disease with acute exacerbation (CMS/HCC)  -     methylPREDNISolone acetate (DEPO-medrol) injection 80 mg  -     triamcinolone acetonide (KENALOG-40) injection 20 mg    Laryngitis  -     methylPREDNISolone acetate (DEPO-medrol) injection 80 mg  -     triamcinolone acetonide (KENALOG-40) injection 20 mg    Acute URI    Essential hypertension    Mixed hyperlipidemia    Impaired fasting glucose    Sensorineural hearing loss (SNHL) of both ears    Other orders  -     benzonatate (TESSALON) 200 MG capsule; Take 1 capsule by mouth 3 (Three) Times a Day As Needed for Cough for up to 30 doses.  -     albuterol sulfate  (90 Base) MCG/ACT inhaler; Inhale 2 puffs Every 4 (Four) Hours As Needed for Wheezing.  -     azithromycin (ZITHROMAX) 250 MG tablet; Take 2 tablets the first day, then 1 tablet daily for 4 days.        No visits with results within 3 Week(s) from this visit.   Latest known visit with results is:   Lab on 01/06/2020   Component Date Value Ref Range Status   • WBC 01/06/2020 6.89  3.40 - 10.80 10*3/mm3 Final   • RBC 01/06/2020 4.90  4.14 - 5.80 10*6/mm3 Final   • Hemoglobin 01/06/2020 14.9  13.0 - 17.7 g/dL Final   •  Hematocrit 01/06/2020 43.2  37.5 - 51.0 % Final   • MCV 01/06/2020 88.2  79.0 - 97.0 fL Final   • MCH 01/06/2020 30.4  26.6 - 33.0 pg Final   • MCHC 01/06/2020 34.5  31.5 - 35.7 g/dL Final   • RDW 01/06/2020 12.5  12.3 - 15.4 % Final   • RDW-SD 01/06/2020 40.1  37.0 - 54.0 fl Final   • MPV 01/06/2020 8.6  6.0 - 12.0 fL Final   • Platelets 01/06/2020 264  140 - 450 10*3/mm3 Final   • Neutrophil % 01/06/2020 63.3  42.7 - 76.0 % Final   • Lymphocyte % 01/06/2020 23.5  19.6 - 45.3 % Final   • Monocyte % 01/06/2020 10.0  5.0 - 12.0 % Final   • Eosinophil % 01/06/2020 2.9  0.3 - 6.2 % Final   • Basophil % 01/06/2020 0.3  0.0 - 1.5 % Final   • Neutrophils, Absolute 01/06/2020 4.36  1.70 - 7.00 10*3/mm3 Final   • Lymphocytes, Absolute 01/06/2020 1.62  0.70 - 3.10 10*3/mm3 Final   • Monocytes, Absolute 01/06/2020 0.69  0.10 - 0.90 10*3/mm3 Final   • Eosinophils, Absolute 01/06/2020 0.20  0.00 - 0.40 10*3/mm3 Final   • Basophils, Absolute 01/06/2020 0.02  0.00 - 0.20 10*3/mm3 Final   • Glucose 01/06/2020 106* 70 - 99 mg/dL Final   • BUN 01/06/2020 19  7 - 23 mg/dL Final   • Creatinine 01/06/2020 1.07  0.70 - 1.30 mg/dL Final   • Sodium 01/06/2020 137  137 - 145 mmol/L Final   • Potassium 01/06/2020 4.3  3.4 - 5.0 mmol/L Final   • Chloride 01/06/2020 101  101 - 112 mmol/L Final   • CO2 01/06/2020 30.0  22.0 - 30.0 mmol/L Final   • Calcium 01/06/2020 9.3  8.4 - 10.2 mg/dL Final   • Total Protein 01/06/2020 7.3  6.3 - 8.6 g/dL Final   • Albumin 01/06/2020 4.30  3.50 - 5.00 g/dL Final   • ALT (SGPT) 01/06/2020 38  <=50 U/L Final   • AST (SGOT) 01/06/2020 25  17 - 59 U/L Final   • Alkaline Phosphatase 01/06/2020 81  38 - 126 U/L Final   • Total Bilirubin 01/06/2020 0.7  0.2 - 1.3 mg/dL Final   • eGFR Non African Amer 01/06/2020 70  49 - 113 mL/min/1.73 Final   • Globulin 01/06/2020 3.0  2.3 - 3.5 gm/dL Final   • A/G Ratio 01/06/2020 1.4  1.1 - 1.8 g/dL Final   • BUN/Creatinine Ratio 01/06/2020 17.8  7.0 - 25.0 Final   • Anion Gap  01/06/2020 6.0  5.0 - 15.0 mmol/L Final   • Hemoglobin A1C 01/06/2020 6.00* 4.80 - 5.60 % Final   • Total Cholesterol 01/06/2020 162  150 - 200 mg/dL Final   • Triglycerides 01/06/2020 123  <=150 mg/dL Final   • HDL Cholesterol 01/06/2020 29* 40 - 59 mg/dL Final   • LDL Cholesterol  01/06/2020 108* <=100 mg/dL Final   • VLDL Cholesterol 01/06/2020 24.6  mg/dL Final   • LDL/HDL Ratio 01/06/2020 3.74* 0.00 - 3.55 Final   • Uric Acid 01/06/2020 8.0  3.5 - 8.5 mg/dL Final   ]

## 2020-04-06 RX ORDER — LOSARTAN POTASSIUM 50 MG/1
TABLET ORAL
Qty: 90 TABLET | Refills: 3 | OUTPATIENT
Start: 2020-04-06

## 2020-04-08 RX ORDER — LOSARTAN POTASSIUM 50 MG/1
TABLET ORAL
Qty: 90 TABLET | Refills: 0 | OUTPATIENT
Start: 2020-04-08

## 2020-04-08 RX ORDER — LOSARTAN POTASSIUM 100 MG/1
100 TABLET ORAL DAILY
Qty: 90 TABLET | Refills: 3 | Status: SHIPPED | OUTPATIENT
Start: 2020-04-08 | End: 2020-06-30 | Stop reason: DRUGHIGH

## 2020-04-09 RX ORDER — LOSARTAN POTASSIUM 50 MG/1
TABLET ORAL
Qty: 90 TABLET | Refills: 0 | Status: SHIPPED | OUTPATIENT
Start: 2020-04-09 | End: 2020-06-30 | Stop reason: SDUPTHER

## 2020-06-29 RX ORDER — LOSARTAN POTASSIUM 50 MG/1
TABLET ORAL
Qty: 90 TABLET | Refills: 3 | OUTPATIENT
Start: 2020-06-29

## 2020-06-30 RX ORDER — LOSARTAN POTASSIUM 50 MG/1
50 TABLET ORAL DAILY
Qty: 90 TABLET | Refills: 0 | OUTPATIENT
Start: 2020-06-30

## 2020-06-30 RX ORDER — LOSARTAN POTASSIUM 50 MG/1
50 TABLET ORAL 2 TIMES DAILY
Qty: 180 TABLET | Refills: 3 | Status: SHIPPED | OUTPATIENT
Start: 2020-06-30 | End: 2021-10-18

## 2020-06-30 RX ORDER — LOSARTAN POTASSIUM 50 MG/1
TABLET ORAL
Qty: 90 TABLET | Refills: 0 | OUTPATIENT
Start: 2020-06-30

## 2020-08-07 ENCOUNTER — LAB (OUTPATIENT)
Dept: LAB | Facility: OTHER | Age: 65
End: 2020-08-07

## 2020-08-07 DIAGNOSIS — I10 ESSENTIAL HYPERTENSION: Chronic | ICD-10-CM

## 2020-08-07 DIAGNOSIS — M1A.9XX0 CHRONIC GOUT WITHOUT TOPHUS, UNSPECIFIED CAUSE, UNSPECIFIED SITE: Chronic | ICD-10-CM

## 2020-08-07 DIAGNOSIS — E78.2 MIXED HYPERLIPIDEMIA: Chronic | ICD-10-CM

## 2020-08-07 DIAGNOSIS — Z12.5 SPECIAL SCREENING FOR MALIGNANT NEOPLASM OF PROSTATE: ICD-10-CM

## 2020-08-07 DIAGNOSIS — R73.01 IMPAIRED FASTING GLUCOSE: Chronic | ICD-10-CM

## 2020-08-07 LAB
ALBUMIN SERPL-MCNC: 3.8 G/DL (ref 3.5–5)
ALBUMIN/GLOB SERPL: 1.5 G/DL (ref 1.1–1.8)
ALP SERPL-CCNC: 65 U/L (ref 38–126)
ALT SERPL W P-5'-P-CCNC: 36 U/L
ANION GAP SERPL CALCULATED.3IONS-SCNC: 6 MMOL/L (ref 5–15)
AST SERPL-CCNC: 25 U/L (ref 17–59)
BASOPHILS # BLD AUTO: 0.01 10*3/MM3 (ref 0–0.2)
BASOPHILS NFR BLD AUTO: 0.2 % (ref 0–1.5)
BILIRUB SERPL-MCNC: 0.5 MG/DL (ref 0.2–1.3)
BUN SERPL-MCNC: 18 MG/DL (ref 7–23)
BUN/CREAT SERPL: 17.1 (ref 7–25)
CALCIUM SPEC-SCNC: 9.3 MG/DL (ref 8.4–10.2)
CHLORIDE SERPL-SCNC: 102 MMOL/L (ref 101–112)
CHOLEST SERPL-MCNC: 150 MG/DL (ref 150–200)
CO2 SERPL-SCNC: 28 MMOL/L (ref 22–30)
CREAT SERPL-MCNC: 1.05 MG/DL (ref 0.7–1.3)
DEPRECATED RDW RBC AUTO: 40.9 FL (ref 37–54)
EOSINOPHIL # BLD AUTO: 0.14 10*3/MM3 (ref 0–0.4)
EOSINOPHIL NFR BLD AUTO: 2.2 % (ref 0.3–6.2)
ERYTHROCYTE [DISTWIDTH] IN BLOOD BY AUTOMATED COUNT: 12.6 % (ref 12.3–15.4)
GFR SERPL CREATININE-BSD FRML MDRD: 71 ML/MIN/1.73 (ref 49–113)
GLOBULIN UR ELPH-MCNC: 2.6 GM/DL (ref 2.3–3.5)
GLUCOSE SERPL-MCNC: 111 MG/DL (ref 70–99)
HBA1C MFR BLD: 5.6 % (ref 4.8–5.6)
HCT VFR BLD AUTO: 40.8 % (ref 37.5–51)
HDLC SERPL-MCNC: 35 MG/DL (ref 40–59)
HGB BLD-MCNC: 13.7 G/DL (ref 13–17.7)
LDLC SERPL CALC-MCNC: 100 MG/DL
LDLC/HDLC SERPL: 2.86 {RATIO} (ref 0–3.55)
LYMPHOCYTES # BLD AUTO: 1.37 10*3/MM3 (ref 0.7–3.1)
LYMPHOCYTES NFR BLD AUTO: 21.4 % (ref 19.6–45.3)
MCH RBC QN AUTO: 30.7 PG (ref 26.6–33)
MCHC RBC AUTO-ENTMCNC: 33.6 G/DL (ref 31.5–35.7)
MCV RBC AUTO: 91.5 FL (ref 79–97)
MONOCYTES # BLD AUTO: 0.49 10*3/MM3 (ref 0.1–0.9)
MONOCYTES NFR BLD AUTO: 7.6 % (ref 5–12)
NEUTROPHILS NFR BLD AUTO: 4.4 10*3/MM3 (ref 1.7–7)
NEUTROPHILS NFR BLD AUTO: 68.6 % (ref 42.7–76)
PLATELET # BLD AUTO: 242 10*3/MM3 (ref 140–450)
PMV BLD AUTO: 9 FL (ref 6–12)
POTASSIUM SERPL-SCNC: 4.2 MMOL/L (ref 3.4–5)
PROT SERPL-MCNC: 6.4 G/DL (ref 6.3–8.6)
PSA SERPL-MCNC: 0.69 NG/ML (ref 0–4)
RBC # BLD AUTO: 4.46 10*6/MM3 (ref 4.14–5.8)
SODIUM SERPL-SCNC: 136 MMOL/L (ref 137–145)
TRIGL SERPL-MCNC: 74 MG/DL
TSH SERPL DL<=0.05 MIU/L-ACNC: 2.02 UIU/ML (ref 0.27–4.2)
URATE SERPL-MCNC: 6.9 MG/DL (ref 3.5–8.5)
VLDLC SERPL-MCNC: 14.8 MG/DL
WBC # BLD AUTO: 6.41 10*3/MM3 (ref 3.4–10.8)

## 2020-08-07 PROCEDURE — 36415 COLL VENOUS BLD VENIPUNCTURE: CPT | Performed by: INTERNAL MEDICINE

## 2020-08-07 PROCEDURE — 80053 COMPREHEN METABOLIC PANEL: CPT | Performed by: INTERNAL MEDICINE

## 2020-08-07 PROCEDURE — 80061 LIPID PANEL: CPT | Performed by: INTERNAL MEDICINE

## 2020-08-07 PROCEDURE — 84443 ASSAY THYROID STIM HORMONE: CPT | Performed by: INTERNAL MEDICINE

## 2020-08-07 PROCEDURE — G0103 PSA SCREENING: HCPCS | Performed by: INTERNAL MEDICINE

## 2020-08-07 PROCEDURE — 83036 HEMOGLOBIN GLYCOSYLATED A1C: CPT | Performed by: INTERNAL MEDICINE

## 2020-08-07 PROCEDURE — 85025 COMPLETE CBC W/AUTO DIFF WBC: CPT | Performed by: INTERNAL MEDICINE

## 2020-08-07 PROCEDURE — 84550 ASSAY OF BLOOD/URIC ACID: CPT | Performed by: INTERNAL MEDICINE

## 2020-08-12 ENCOUNTER — OFFICE VISIT (OUTPATIENT)
Dept: FAMILY MEDICINE CLINIC | Facility: CLINIC | Age: 65
End: 2020-08-12

## 2020-08-12 VITALS — WEIGHT: 175 LBS | HEIGHT: 68 IN | BODY MASS INDEX: 26.52 KG/M2

## 2020-08-12 DIAGNOSIS — Z77.22 SECONDHAND SMOKE EXPOSURE: Chronic | ICD-10-CM

## 2020-08-12 DIAGNOSIS — E78.2 MIXED HYPERLIPIDEMIA: Chronic | ICD-10-CM

## 2020-08-12 DIAGNOSIS — R73.01 IMPAIRED FASTING GLUCOSE: Chronic | ICD-10-CM

## 2020-08-12 DIAGNOSIS — M1A.9XX0 CHRONIC GOUT WITHOUT TOPHUS, UNSPECIFIED CAUSE, UNSPECIFIED SITE: Chronic | ICD-10-CM

## 2020-08-12 DIAGNOSIS — H90.3 SENSORINEURAL HEARING LOSS (SNHL) OF BOTH EARS: Chronic | ICD-10-CM

## 2020-08-12 DIAGNOSIS — I10 ESSENTIAL HYPERTENSION: Primary | Chronic | ICD-10-CM

## 2020-08-12 PROCEDURE — G2025 DIS SITE TELE SVCS RHC/FQHC: HCPCS | Performed by: INTERNAL MEDICINE

## 2020-08-12 NOTE — PROGRESS NOTES
Subjective     You have chosen to receive care through a telephone visit. Do you consent to use a telephone visit for your medical care today? Yes   Total visit time: 22 minutes.     History of Present Illness     Don Oscar is a 65 y.o. male who receives care via telephone visit for 6-month follow up on hyperlipidemia, hypertension, and impaired fasting glucose, among other issues. 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  He continues on prophylactic allopurinol for gout.  Since his last visit, he reports two mild gout flares lasting only one day each.    Reviewed health maintenance issues today.  He is up to date on colonoscopy.  He had Prevnar 10/15/2019.  We will plan on giving Pneumovax after 10/15/2020.  We will plan to schedule Initial Medicare Wellness exam after 10/15/2020 as well.      He reports hearing loss with an abnormal hearing screen being done at work recently and is requesting ENT referral.  He has noticed having to increase volume on television suggestive of sensorineural hearing loss.      He reports stable body weight, although, impaired fasting glucose has improved with A1c 5.6 down from 6.0 with dietary efforts.      He has not been monitoring BP at home and doesn't have a BP cuff.       The patient's relevant past medical, surgical, and social history was reviewed in Epic.   Lab results are reviewed with the patient today.  Fasting glucose 111.  Normal renal and liver function.  Total cholesterol improved at  150.  HDL improved, but remain low at 35.  .  Triglycerides 74.  LDL/HDL ratio 2.86.      Review of Systems   Constitutional: Negative for chills, fatigue and fever.   HENT: Positive for hearing loss. Negative for congestion, ear pain, postnasal drip, sinus pressure and sore throat.    Respiratory: Negative for cough, shortness of breath and wheezing.    Cardiovascular: Negative for chest pain, palpitations and leg swelling.  "  Gastrointestinal: Negative for abdominal pain, blood in stool, constipation, diarrhea, nausea and vomiting.   Endocrine: Negative for cold intolerance, heat intolerance, polydipsia and polyuria.   Genitourinary: Negative for dysuria, frequency, hematuria and urgency.   Skin: Negative for rash.   Neurological: Negative for syncope and weakness.        Objective     Visit Vitals  Ht 172.7 cm (68\")   Wt 79.4 kg (175 lb)   BMI 26.61 kg/m²     Physical Exam      Assessment/Plan      Refer to Dr. J Carlos Walsh, ENT, to address the abnormal hearing test suggestive of sensorineural hearing loss.    Continue losartan 50 mg daily and sodium restriction.  I asked patient to monitor BP episodically at rest if he has access to a BP cuff. Notify me if not consistently at goal.       Continue allopurinol 100 mg daily for gout prophylaxis.     Continue Lipitor 10 mg daily and dietary efforts.    Recommended patient have influenza vaccine in September/October.  He will also be due Pneumovax after 10/15/2020.    Return in six months for routine follow up with fasting labs one week prior or sooner if needed. We will schedule patient for Initial Medicare Wellness Exam in October.     Scribed for Dr. Ríos by Ceci Hensley [facility].     Diagnoses and all orders for this visit:    Essential hypertension  -     CBC Auto Differential; Future  -     Comprehensive Metabolic Panel; Future    Mixed hyperlipidemia  -     LDL Cholesterol, Direct; Future    Impaired fasting glucose  -     Hemoglobin A1c; Future    Sensorineural hearing loss (SNHL) of both ears  -     Ambulatory Referral to ENT (Otolaryngology)    Chronic gout without tophus, unspecified cause, unspecified site  -     Uric Acid; Future    Secondhand smoke exposure        Lab on 08/07/2020   Component Date Value Ref Range Status   • WBC 08/07/2020 6.41  3.40 - 10.80 10*3/mm3 Final   • RBC 08/07/2020 4.46  4.14 - 5.80 10*6/mm3 Final   • Hemoglobin 08/07/2020 13.7  13.0 - 17.7 " g/dL Final   • Hematocrit 08/07/2020 40.8  37.5 - 51.0 % Final   • MCV 08/07/2020 91.5  79.0 - 97.0 fL Final   • MCH 08/07/2020 30.7  26.6 - 33.0 pg Final   • MCHC 08/07/2020 33.6  31.5 - 35.7 g/dL Final   • RDW 08/07/2020 12.6  12.3 - 15.4 % Final   • RDW-SD 08/07/2020 40.9  37.0 - 54.0 fl Final   • MPV 08/07/2020 9.0  6.0 - 12.0 fL Final   • Platelets 08/07/2020 242  140 - 450 10*3/mm3 Final   • Neutrophil % 08/07/2020 68.6  42.7 - 76.0 % Final   • Lymphocyte % 08/07/2020 21.4  19.6 - 45.3 % Final   • Monocyte % 08/07/2020 7.6  5.0 - 12.0 % Final   • Eosinophil % 08/07/2020 2.2  0.3 - 6.2 % Final   • Basophil % 08/07/2020 0.2  0.0 - 1.5 % Final   • Neutrophils, Absolute 08/07/2020 4.40  1.70 - 7.00 10*3/mm3 Final   • Lymphocytes, Absolute 08/07/2020 1.37  0.70 - 3.10 10*3/mm3 Final   • Monocytes, Absolute 08/07/2020 0.49  0.10 - 0.90 10*3/mm3 Final   • Eosinophils, Absolute 08/07/2020 0.14  0.00 - 0.40 10*3/mm3 Final   • Basophils, Absolute 08/07/2020 0.01  0.00 - 0.20 10*3/mm3 Final   • Glucose 08/07/2020 111* 70 - 99 mg/dL Final   • BUN 08/07/2020 18  7 - 23 mg/dL Final   • Creatinine 08/07/2020 1.05  0.70 - 1.30 mg/dL Final   • Sodium 08/07/2020 136* 137 - 145 mmol/L Final   • Potassium 08/07/2020 4.2  3.4 - 5.0 mmol/L Final   • Chloride 08/07/2020 102  101 - 112 mmol/L Final   • CO2 08/07/2020 28.0  22.0 - 30.0 mmol/L Final   • Calcium 08/07/2020 9.3  8.4 - 10.2 mg/dL Final   • Total Protein 08/07/2020 6.4  6.3 - 8.6 g/dL Final   • Albumin 08/07/2020 3.80  3.50 - 5.00 g/dL Final   • ALT (SGPT) 08/07/2020 36  <=50 U/L Final   • AST (SGOT) 08/07/2020 25  17 - 59 U/L Final   • Alkaline Phosphatase 08/07/2020 65  38 - 126 U/L Final   • Total Bilirubin 08/07/2020 0.5  0.2 - 1.3 mg/dL Final   • eGFR Non African Amer 08/07/2020 71  49 - 113 mL/min/1.73 Final   • Globulin 08/07/2020 2.6  2.3 - 3.5 gm/dL Final   • A/G Ratio 08/07/2020 1.5  1.1 - 1.8 g/dL Final   • BUN/Creatinine Ratio 08/07/2020 17.1  7.0 - 25.0 Final    • Anion Gap 08/07/2020 6.0  5.0 - 15.0 mmol/L Final   • Hemoglobin A1C 08/07/2020 5.60  4.80 - 5.60 % Final   • Total Cholesterol 08/07/2020 150  150 - 200 mg/dL Final   • Triglycerides 08/07/2020 74  <=150 mg/dL Final   • HDL Cholesterol 08/07/2020 35* 40 - 59 mg/dL Final   • LDL Cholesterol  08/07/2020 100  <=100 mg/dL Final   • VLDL Cholesterol 08/07/2020 14.8  mg/dL Final   • LDL/HDL Ratio 08/07/2020 2.86  0.00 - 3.55 Final   • TSH 08/07/2020 2.020  0.270 - 4.200 uIU/mL Final   • Uric Acid 08/07/2020 6.9  3.5 - 8.5 mg/dL Final   • PSA 08/07/2020 0.693  0.000 - 4.000 ng/mL Final   ]

## 2020-08-26 RX ORDER — AZITHROMYCIN 250 MG/1
TABLET, FILM COATED ORAL
Qty: 6 TABLET | Refills: 0 | OUTPATIENT
Start: 2020-08-26

## 2020-08-26 RX ORDER — ATORVASTATIN CALCIUM 10 MG/1
TABLET, FILM COATED ORAL
Qty: 30 TABLET | Refills: 11 | Status: SHIPPED | OUTPATIENT
Start: 2020-08-26 | End: 2021-10-18

## 2020-09-13 ENCOUNTER — HOSPITAL ENCOUNTER (EMERGENCY)
Facility: HOSPITAL | Age: 65
Discharge: HOME | End: 2020-09-13
Attending: EMERGENCY MEDICINE | Admitting: EMERGENCY MEDICINE

## 2020-09-13 ENCOUNTER — APPOINTMENT (OUTPATIENT)
Dept: GENERAL RADIOLOGY | Facility: HOSPITAL | Age: 65
End: 2020-09-13

## 2020-09-13 ENCOUNTER — APPOINTMENT (OUTPATIENT)
Dept: CT IMAGING | Facility: HOSPITAL | Age: 65
End: 2020-09-13

## 2020-09-13 VITALS
OXYGEN SATURATION: 98 % | WEIGHT: 175 LBS | HEIGHT: 68 IN | TEMPERATURE: 98.8 F | HEART RATE: 70 BPM | BODY MASS INDEX: 26.52 KG/M2 | RESPIRATION RATE: 18 BRPM | DIASTOLIC BLOOD PRESSURE: 65 MMHG | SYSTOLIC BLOOD PRESSURE: 137 MMHG

## 2020-09-13 DIAGNOSIS — L23.9 ALLERGIC CONTACT DERMATITIS, UNSPECIFIED TRIGGER: ICD-10-CM

## 2020-09-13 DIAGNOSIS — R55 VASOVAGAL SYNCOPE: Primary | ICD-10-CM

## 2020-09-13 LAB
ALBUMIN SERPL-MCNC: 4 G/DL (ref 3.5–5.2)
ALBUMIN/GLOB SERPL: 1.8 G/DL
ALP SERPL-CCNC: 81 U/L (ref 39–117)
ALT SERPL W P-5'-P-CCNC: 21 U/L (ref 1–41)
ANION GAP SERPL CALCULATED.3IONS-SCNC: 9 MMOL/L (ref 5–15)
AST SERPL-CCNC: 17 U/L (ref 1–40)
BASOPHILS # BLD AUTO: 0.02 10*3/MM3 (ref 0–0.2)
BASOPHILS NFR BLD AUTO: 0.2 % (ref 0–1.5)
BILIRUB SERPL-MCNC: 0.6 MG/DL (ref 0–1.2)
BUN SERPL-MCNC: 15 MG/DL (ref 8–23)
BUN/CREAT SERPL: 14 (ref 7–25)
CALCIUM SPEC-SCNC: 9.2 MG/DL (ref 8.6–10.5)
CHLORIDE SERPL-SCNC: 104 MMOL/L (ref 98–107)
CK MB SERPL-CCNC: 3.11 NG/ML
CK SERPL-CCNC: 138 U/L (ref 20–200)
CO2 SERPL-SCNC: 25 MMOL/L (ref 22–29)
CREAT SERPL-MCNC: 1.07 MG/DL (ref 0.76–1.27)
DEPRECATED RDW RBC AUTO: 39.6 FL (ref 37–54)
EOSINOPHIL # BLD AUTO: 0 10*3/MM3 (ref 0–0.4)
EOSINOPHIL NFR BLD AUTO: 0 % (ref 0.3–6.2)
ERYTHROCYTE [DISTWIDTH] IN BLOOD BY AUTOMATED COUNT: 12.1 % (ref 12.3–15.4)
GFR SERPL CREATININE-BSD FRML MDRD: 69 ML/MIN/1.73
GLOBULIN UR ELPH-MCNC: 2.2 GM/DL
GLUCOSE SERPL-MCNC: 145 MG/DL (ref 65–99)
HCT VFR BLD AUTO: 45.4 % (ref 37.5–51)
HGB BLD-MCNC: 15.5 G/DL (ref 13–17.7)
HOLD SPECIMEN: NORMAL
HOLD SPECIMEN: NORMAL
IMM GRANULOCYTES # BLD AUTO: 0.05 10*3/MM3 (ref 0–0.05)
IMM GRANULOCYTES NFR BLD AUTO: 0.4 % (ref 0–0.5)
LYMPHOCYTES # BLD AUTO: 0.77 10*3/MM3 (ref 0.7–3.1)
LYMPHOCYTES NFR BLD AUTO: 5.8 % (ref 19.6–45.3)
MCH RBC QN AUTO: 30.9 PG (ref 26.6–33)
MCHC RBC AUTO-ENTMCNC: 34.1 G/DL (ref 31.5–35.7)
MCV RBC AUTO: 90.4 FL (ref 79–97)
MONOCYTES # BLD AUTO: 0.29 10*3/MM3 (ref 0.1–0.9)
MONOCYTES NFR BLD AUTO: 2.2 % (ref 5–12)
NEUTROPHILS NFR BLD AUTO: 12.07 10*3/MM3 (ref 1.7–7)
NEUTROPHILS NFR BLD AUTO: 91.4 % (ref 42.7–76)
NRBC BLD AUTO-RTO: 0 /100 WBC (ref 0–0.2)
PLATELET # BLD AUTO: 348 10*3/MM3 (ref 140–450)
PMV BLD AUTO: 9.5 FL (ref 6–12)
POTASSIUM SERPL-SCNC: 4.1 MMOL/L (ref 3.5–5.2)
PROT SERPL-MCNC: 6.2 G/DL (ref 6–8.5)
RBC # BLD AUTO: 5.02 10*6/MM3 (ref 4.14–5.8)
SODIUM SERPL-SCNC: 138 MMOL/L (ref 136–145)
TROPONIN T SERPL-MCNC: <0.01 NG/ML (ref 0–0.03)
WBC # BLD AUTO: 13.2 10*3/MM3 (ref 3.4–10.8)
WHOLE BLOOD HOLD SPECIMEN: NORMAL
WHOLE BLOOD HOLD SPECIMEN: NORMAL

## 2020-09-13 PROCEDURE — 71045 X-RAY EXAM CHEST 1 VIEW: CPT

## 2020-09-13 PROCEDURE — 80053 COMPREHEN METABOLIC PANEL: CPT | Performed by: NURSE PRACTITIONER

## 2020-09-13 PROCEDURE — 84484 ASSAY OF TROPONIN QUANT: CPT | Performed by: NURSE PRACTITIONER

## 2020-09-13 PROCEDURE — 96375 TX/PRO/DX INJ NEW DRUG ADDON: CPT

## 2020-09-13 PROCEDURE — 82553 CREATINE MB FRACTION: CPT | Performed by: NURSE PRACTITIONER

## 2020-09-13 PROCEDURE — 70450 CT HEAD/BRAIN W/O DYE: CPT

## 2020-09-13 PROCEDURE — 93005 ELECTROCARDIOGRAM TRACING: CPT | Performed by: NURSE PRACTITIONER

## 2020-09-13 PROCEDURE — 93010 ELECTROCARDIOGRAM REPORT: CPT | Performed by: INTERNAL MEDICINE

## 2020-09-13 PROCEDURE — 99284 EMERGENCY DEPT VISIT MOD MDM: CPT

## 2020-09-13 PROCEDURE — 82550 ASSAY OF CK (CPK): CPT | Performed by: NURSE PRACTITIONER

## 2020-09-13 PROCEDURE — 25010000002 DIPHENHYDRAMINE PER 50 MG: Performed by: NURSE PRACTITIONER

## 2020-09-13 PROCEDURE — 85025 COMPLETE CBC W/AUTO DIFF WBC: CPT | Performed by: NURSE PRACTITIONER

## 2020-09-13 PROCEDURE — 96374 THER/PROPH/DIAG INJ IV PUSH: CPT

## 2020-09-13 PROCEDURE — 25010000002 DEXAMETHASONE SODIUM PHOSPHATE 10 MG/ML SOLUTION: Performed by: NURSE PRACTITIONER

## 2020-09-13 RX ORDER — METHYLPREDNISOLONE 4 MG/1
TABLET ORAL
Qty: 21 TABLET | Refills: 0 | Status: SHIPPED | OUTPATIENT
Start: 2020-09-13 | End: 2020-10-30

## 2020-09-13 RX ORDER — DIPHENHYDRAMINE HYDROCHLORIDE 50 MG/ML
25 INJECTION INTRAMUSCULAR; INTRAVENOUS ONCE
Status: COMPLETED | OUTPATIENT
Start: 2020-09-13 | End: 2020-09-13

## 2020-09-13 RX ORDER — DEXAMETHASONE SODIUM PHOSPHATE 10 MG/ML
10 INJECTION INTRAMUSCULAR; INTRAVENOUS EVERY 6 HOURS
Status: DISCONTINUED | OUTPATIENT
Start: 2020-09-13 | End: 2020-09-13 | Stop reason: HOSPADM

## 2020-09-13 RX ADMIN — DEXAMETHASONE SODIUM PHOSPHATE 10 MG: 10 INJECTION INTRAMUSCULAR; INTRAVENOUS at 09:47

## 2020-09-13 RX ADMIN — SODIUM CHLORIDE 1000 ML: 900 INJECTION, SOLUTION INTRAVENOUS at 12:00

## 2020-09-13 RX ADMIN — DIPHENHYDRAMINE HYDROCHLORIDE 25 MG: 50 INJECTION INTRAMUSCULAR; INTRAVENOUS at 09:46

## 2020-09-13 NOTE — ED PROVIDER NOTES
Subjective   65-year-old male in the emergency department today complaining of a rash on his trunk and upper and lower extremities.  Patient reports that he was working in some mulch yesterday and noticed this rash.  In the middle of the night he got up to use the bathroom became nauseated and reports a syncopal episode.      History provided by:  Patient   used: No        Review of Systems   Constitutional: Negative for fatigue and fever.   HENT: Negative for congestion.    Respiratory: Negative for shortness of breath.    Cardiovascular: Negative for chest pain and palpitations.   Gastrointestinal: Positive for nausea. Negative for abdominal pain and vomiting.   Genitourinary: Negative for flank pain.   Musculoskeletal: Negative for joint swelling.   Skin: Positive for rash.   Allergic/Immunologic: Negative for immunocompromised state.   Neurological: Positive for syncope. Negative for dizziness.   Hematological: Negative for adenopathy.   Psychiatric/Behavioral: Negative for confusion.   All other systems reviewed and are negative.      Past Medical History:   Diagnosis Date   • Abnormal glucose level    • Acute otitis externa    • Acute sinusitis    • Essential hypertension 6/8/2018   • Gout    • Gout    • Gout    • History of colon polyps 1/11/2019   • Mixed hyperlipidemia 6/8/2018   • Secondhand smoke exposure 6/8/2018   • Tinnitus    • Upper respiratory infection        No Known Allergies    Past Surgical History:   Procedure Laterality Date   • COLONOSCOPY  12/21/2011    1 polyp in colon; removed by cold biopsy polypectomy. Internal & external hemorrhoids found.   • INJECTION OF MEDICATION  12/23/2013    Depo Medrol(2)   • INJECTION OF MEDICATION  08/30/2013    Dexamethasone(1)   • INJECTION OF MEDICATION  05/15/2011    Kenalog(1)       Family History   Problem Relation Age of Onset   • Stroke Mother    • Stroke Father        Social History     Socioeconomic History   • Marital status:       Spouse name: Not on file   • Number of children: Not on file   • Years of education: Not on file   • Highest education level: Not on file   Tobacco Use   • Smoking status: Former Smoker     Packs/day: 1.50     Years: 7.00     Pack years: 10.50     Types: Cigarettes     Quit date:      Years since quittin.7   • Smokeless tobacco: Never Used   Substance and Sexual Activity   • Alcohol use: Yes     Alcohol/week: 2.0 standard drinks     Types: 2 Cans of beer per week   • Drug use: No   • Sexual activity: Defer           Objective   Physical Exam  Vitals signs and nursing note reviewed.   Constitutional:       Appearance: He is well-developed.   HENT:      Head: Normocephalic.      Nose: Nose normal.   Eyes:      Conjunctiva/sclera: Conjunctivae normal.      Pupils: Pupils are equal, round, and reactive to light.   Neck:      Musculoskeletal: Normal range of motion.   Cardiovascular:      Rate and Rhythm: Normal rate and regular rhythm.      Heart sounds: Normal heart sounds.   Pulmonary:      Effort: Pulmonary effort is normal.      Breath sounds: Normal breath sounds.   Abdominal:      Palpations: Abdomen is soft.   Musculoskeletal: Normal range of motion.   Skin:     General: Skin is warm and dry.      Findings: Rash present. Rash is macular and papular.   Neurological:      Mental Status: He is alert and oriented to person, place, and time.      GCS: GCS eye subscore is 4. GCS verbal subscore is 5. GCS motor subscore is 6.         ECG 12 Lead      Date/Time: 2020 9:53 AM  Performed by: Francisco Finney APRN  Authorized by: Francisco Finney APRN   Interpreted by physician  Rhythm: sinus rhythm  Rate: normal  QRS axis: normal  Conduction: conduction normal  ST Segments: ST segments normal  T Waves: T waves normal  Other: no other findings  Clinical impression: normal ECG                 ED Course      XR Chest 1 View   Final Result   Minimal left base atelectasis versus infiltrate.          Electronically signed by:  Dayana Jamil MD  9/13/2020 11:32 AM   CDT Workstation: [id number]      CT Head Without Contrast   Final Result   No evidence of intracranial hemorrhage, mass effect or large   acute infarct.         Electronically signed by:  J Carlos Peterson MD  9/13/2020 11:10 AM   CDT Workstation: [id number]        Results for orders placed or performed during the hospital encounter of 09/13/20   Comprehensive Metabolic Panel    Specimen: Blood   Result Value Ref Range    Glucose 145 (H) 65 - 99 mg/dL    BUN 15 8 - 23 mg/dL    Creatinine 1.07 0.76 - 1.27 mg/dL    Sodium 138 136 - 145 mmol/L    Potassium 4.1 3.5 - 5.2 mmol/L    Chloride 104 98 - 107 mmol/L    CO2 25.0 22.0 - 29.0 mmol/L    Calcium 9.2 8.6 - 10.5 mg/dL    Total Protein 6.2 6.0 - 8.5 g/dL    Albumin 4.00 3.50 - 5.20 g/dL    ALT (SGPT) 21 1 - 41 U/L    AST (SGOT) 17 1 - 40 U/L    Alkaline Phosphatase 81 39 - 117 U/L    Total Bilirubin 0.6 0.0 - 1.2 mg/dL    eGFR Non African Amer 69 >60 mL/min/1.73    Globulin 2.2 gm/dL    A/G Ratio 1.8 g/dL    BUN/Creatinine Ratio 14.0 7.0 - 25.0    Anion Gap 9.0 5.0 - 15.0 mmol/L   CK    Specimen: Blood   Result Value Ref Range    Creatine Kinase 138 20 - 200 U/L   CK-MB    Specimen: Blood   Result Value Ref Range    CKMB 3.11 <=10.40 ng/mL   Troponin    Specimen: Blood   Result Value Ref Range    Troponin T <0.010 0.000 - 0.030 ng/mL   CBC Auto Differential    Specimen: Blood   Result Value Ref Range    WBC 13.20 (H) 3.40 - 10.80 10*3/mm3    RBC 5.02 4.14 - 5.80 10*6/mm3    Hemoglobin 15.5 13.0 - 17.7 g/dL    Hematocrit 45.4 37.5 - 51.0 %    MCV 90.4 79.0 - 97.0 fL    MCH 30.9 26.6 - 33.0 pg    MCHC 34.1 31.5 - 35.7 g/dL    RDW 12.1 (L) 12.3 - 15.4 %    RDW-SD 39.6 37.0 - 54.0 fl    MPV 9.5 6.0 - 12.0 fL    Platelets 348 140 - 450 10*3/mm3    Neutrophil % 91.4 (H) 42.7 - 76.0 %    Lymphocyte % 5.8 (L) 19.6 - 45.3 %    Monocyte % 2.2 (L) 5.0 - 12.0 %    Eosinophil % 0.0 (L) 0.3 - 6.2 %    Basophil % 0.2  0.0 - 1.5 %    Immature Grans % 0.4 0.0 - 0.5 %    Neutrophils, Absolute 12.07 (H) 1.70 - 7.00 10*3/mm3    Lymphocytes, Absolute 0.77 0.70 - 3.10 10*3/mm3    Monocytes, Absolute 0.29 0.10 - 0.90 10*3/mm3    Eosinophils, Absolute 0.00 0.00 - 0.40 10*3/mm3    Basophils, Absolute 0.02 0.00 - 0.20 10*3/mm3    Immature Grans, Absolute 0.05 0.00 - 0.05 10*3/mm3    nRBC 0.0 0.0 - 0.2 /100 WBC   Light Blue Top   Result Value Ref Range    Extra Tube hold for add-on    Green Top (Gel)   Result Value Ref Range    Extra Tube Hold for add-ons.    Lavender Top   Result Value Ref Range    Extra Tube hold for add-on    Gold Top - SST   Result Value Ref Range    Extra Tube Hold for add-ons.                                         MDM  Number of Diagnoses or Management Options  Allergic contact dermatitis, unspecified trigger: new and requires workup  Vasovagal syncope: new and requires workup  Diagnosis management comments: 65-year-old male in the emergency department.  See original HPI for details.  Nothing acute noted on labs or images.  Patient instructed to have close follow-up with primary care, cardiology and return to emergency department if needed patient verbalizes all understanding and agreeable for discharge       Amount and/or Complexity of Data Reviewed  Clinical lab tests: reviewed  Tests in the radiology section of CPT®: ordered and reviewed  Tests in the medicine section of CPT®: reviewed    Risk of Complications, Morbidity, and/or Mortality  Presenting problems: moderate  Diagnostic procedures: moderate  Management options: moderate    Patient Progress  Patient progress: stable      Final diagnoses:   Vasovagal syncope   Allergic contact dermatitis, unspecified trigger            Francisco Finney, APRN  09/13/20 1200

## 2020-10-27 ENCOUNTER — CLINICAL SUPPORT (OUTPATIENT)
Dept: AUDIOLOGY | Facility: CLINIC | Age: 65
End: 2020-10-27

## 2020-10-27 ENCOUNTER — OFFICE VISIT (OUTPATIENT)
Dept: OTOLARYNGOLOGY | Facility: CLINIC | Age: 65
End: 2020-10-27

## 2020-10-27 VITALS — OXYGEN SATURATION: 97 % | BODY MASS INDEX: 25.01 KG/M2 | HEIGHT: 68 IN | TEMPERATURE: 98.1 F | WEIGHT: 165 LBS

## 2020-10-27 DIAGNOSIS — H92.02 LEFT EAR PAIN: ICD-10-CM

## 2020-10-27 DIAGNOSIS — H69.81 EUSTACHIAN TUBE DYSFUNCTION, RIGHT: ICD-10-CM

## 2020-10-27 DIAGNOSIS — H90.6 MIXED HEARING LOSS, BILATERAL: Primary | ICD-10-CM

## 2020-10-27 DIAGNOSIS — H72.92 PERFORATION OF LEFT TYMPANIC MEMBRANE: ICD-10-CM

## 2020-10-27 PROCEDURE — 92557 COMPREHENSIVE HEARING TEST: CPT | Performed by: AUDIOLOGIST

## 2020-10-27 PROCEDURE — 92567 TYMPANOMETRY: CPT | Performed by: AUDIOLOGIST

## 2020-10-27 PROCEDURE — 99203 OFFICE O/P NEW LOW 30 MIN: CPT | Performed by: OTOLARYNGOLOGY

## 2020-10-27 NOTE — PROGRESS NOTES
STANDARD AUDIOMETRIC EVALUATION      Name:  Don Oscar  :  1955  Age:  65 y.o.  Date of Evaluation:  10/27/2020      HISTORY    Reason for visit:  Don Oscar is seen today for a hearing test at the request of Dr. J Carlos Walsh.  Patient reports he has had ear pain, discharge, and hearing loss in his left ear.  He states he has not had ear surgeries.      EVALUATION    See Audiogram    RESULTS        Otoscopy and Tympanometry 226 Hz :  Right Ear:  Otoscopy:  Testing completed after ears were examined by the ENT physician          Tympanometry:  Negative middle ear pressure    Left Ear:   Otoscopy:  Testing completed after ears were examined by the ENT physician        Tympanometry:  Middle ear function within normal limits    Test technique:  Standard Audiometry     Pure Tone Audiometry:   Patient responded to pure tones at 20-65 dB for 250-6000 Hz, and no response at 8000 Hz in right ear, and at 55-95 dB for 250-8000 Hz in left ear.       Speech Audiometry:        Right Ear:  Speech Reception Threshold (SRT) was obtained at 25 dBHL                 Speech Discrimination scores were 72% in quiet when words were presented at 65 dBHL       Left Ear:  Speech Reception Threshold (SRT) was obtained at 60 dBHL                 Speech Discrimination scores were 92% in masking noise when words were presented at  90 dBHL    Reliability:   good    IMPRESSIONS:  1.  Tympanometry results are consistent with Negative middle ear pressure in right ear, and Middle ear function within normal limits in left ear.  2.  Pure tone results are consistent with mild to profound sloping mixed hearing loss  for right ear, and moderate to profound sloping mixed hearing loss  in left ear.       RECOMMENDATIONS:  Patient is seeing the Ear Nose and Throat physician immediately following this examination.  It was a pleasure seeing Don Oscar in Audiology today.  We would be happy to do further testing or discuss these test as  necessary.          This document has been electronically signed by Mary Montes MS CCC-A on October 27, 2020 16:31 CDT       Mary Montes MS CCC-A  Licensed Audiologist

## 2020-10-28 ENCOUNTER — OFFICE VISIT (OUTPATIENT)
Dept: FAMILY MEDICINE CLINIC | Facility: CLINIC | Age: 65
End: 2020-10-28

## 2020-10-28 VITALS — HEIGHT: 68 IN | BODY MASS INDEX: 25.01 KG/M2 | WEIGHT: 165 LBS

## 2020-10-28 DIAGNOSIS — Z11.59 ENCOUNTER FOR HEPATITIS C SCREENING TEST FOR LOW RISK PATIENT: ICD-10-CM

## 2020-10-28 DIAGNOSIS — Z23 NEED FOR 23-POLYVALENT PNEUMOCOCCAL POLYSACCHARIDE VACCINE: ICD-10-CM

## 2020-10-28 DIAGNOSIS — Z00.00 MEDICARE ANNUAL WELLNESS VISIT, INITIAL: Primary | ICD-10-CM

## 2020-10-28 DIAGNOSIS — Z13.6 SCREENING FOR AAA (ABDOMINAL AORTIC ANEURYSM): ICD-10-CM

## 2020-10-28 PROCEDURE — G0438 PPPS, INITIAL VISIT: HCPCS | Performed by: INTERNAL MEDICINE

## 2020-10-28 NOTE — PROGRESS NOTES
The ABCs of the Annual Wellness Visit  Initial Medicare Wellness Visit    Chief Complaint   Patient presents with   • Medicare Wellness-Initial Visit       Subjective   History of Present Illness:  Don Oscar is a 65 y.o. male who presents for an Initial Medicare Wellness Visit.    HEALTH RISK ASSESSMENT    Recent Hospitalizations:  No hospitalization(s) within the last year.    Current Medical Providers:  Patient Care Team:  Abel Ríos MD as PCP - General (Internal Medicine)    Smoking Status:  Social History     Tobacco Use   Smoking Status Former Smoker   • Packs/day: 1.50   • Years: 7.00   • Pack years: 10.50   • Types: Cigarettes   • Quit date:    • Years since quittin.8   Smokeless Tobacco Never Used       Alcohol Consumption:  Social History     Substance and Sexual Activity   Alcohol Use Yes   • Alcohol/week: 2.0 standard drinks   • Types: 2 Cans of beer per week       Depression Screen:   PHQ-2/PHQ-9 Depression Screening 10/28/2020   Little interest or pleasure in doing things 0   Feeling down, depressed, or hopeless 0   Total Score 0       Fall Risk Screen:  STEADI Fall Risk Assessment was completed, and patient is at LOW risk for falls.Assessment completed on:10/28/2020    Health Habits and Functional and Cognitive Screening:  Functional & Cognitive Status 10/28/2020   Do you have difficulty preparing food and eating? No   Do you have difficulty bathing yourself, getting dressed or grooming yourself? No   Do you have difficulty using the toilet? No   Do you have difficulty moving around from place to place? No   Do you have trouble with steps or getting out of a bed or a chair? No   Current Diet Well Balanced Diet   Dental Exam Up to date   Eye Exam Up to date   Exercise (times per week) 3 times per week   Current Exercise Activities Include Yard Work   Do you need help using the phone?  No   Are you deaf or do you have serious difficulty hearing?  No   Do you need help with  transportation? No   Do you need help shopping? No   Do you need help preparing meals?  No   Do you need help with housework?  No   Do you need help with laundry? No   Do you need help taking your medications? No   Do you need help managing money? No   Do you ever drive or ride in a car without wearing a seat belt? No   Have you felt unusual stress, anger or loneliness in the last month? No   Who do you live with? Spouse   If you need help, do you have trouble finding someone available to you? No   Have you been bothered in the last four weeks by sexual problems? No   Do you have difficulty concentrating, remembering or making decisions? No         Does the patient have evidence of cognitive impairment? No    Asprin use counseling:Taking ASA appropriately as indicated    Age-appropriate Screening Schedule:  Refer to the list below for future screening recommendations based on patient's age, sex and/or medical conditions. Orders for these recommended tests are listed in the plan section. The patient has been provided with a written plan.    Health Maintenance   Topic Date Due   • ZOSTER VACCINE (2 of 2) 12/21/2016   • LIPID PANEL  08/07/2021   • COLONOSCOPY  02/16/2023   • TDAP/TD VACCINES (3 - Td) 10/26/2025   • INFLUENZA VACCINE  Completed          The following portions of the patient's history were reviewed and updated as appropriate: allergies, current medications, past medical history, past social history and problem list.    Outpatient Medications Prior to Visit   Medication Sig Dispense Refill   • albuterol sulfate  (90 Base) MCG/ACT inhaler Inhale 2 puffs Every 4 (Four) Hours As Needed for Wheezing. 1 inhaler 11   • allopurinol (ZYLOPRIM) 100 MG tablet TAKE 1 TABLET BY MOUTH DAILY 90 tablet 0   • aspirin 81 MG EC tablet Take 81 mg by mouth Daily.     • atorvastatin (LIPITOR) 10 MG tablet TAKE 1 TABLET BY MOUTH ONCE DAILY FOR CHOLESTEROL 30 tablet 11   • fluticasone (FLONASE) 50 MCG/ACT nasal spray 1  "spray into the nostril(s) as directed by provider 2 (Two) Times a Day. Administer 1 spray in each nostril twice daily. 1 bottle 11   • losartan (Cozaar) 50 MG tablet Take 1 tablet by mouth 2 (Two) Times a Day. 180 tablet 3   • methylPREDNISolone (MEDROL) 4 MG dose pack Take as directed on package instructions. 21 tablet 0   • NON FORMULARY Over the counter cholesterol medication     • ondansetron (ZOFRAN) 4 MG tablet Take 1 tablet by mouth Every 8 (Eight) Hours As Needed for Nausea or Vomiting. 20 tablet 0   • phenylephrine (SUDAFED PE) 10 MG tablet 1 po 2-3 times daily prn congestion 36 tablet 0     No facility-administered medications prior to visit.        Patient Active Problem List   Diagnosis   • Gout   • Impaired fasting glucose   • Mixed hyperlipidemia   • Secondhand smoke exposure   • Essential hypertension   • History of colon polyps   • Sensorineural hearing loss (SNHL) of both ears       Advanced Care Planning:  ACP discussion was declined by the patient. Patient does not have an advance directive, information provided.    Review of Systems    Compared to one year ago, the patient feels his physical health is the same.  Compared to one year ago, the patient feels his mental health is the same.    Reviewed chart for potential of high risk medication in the elderly: yes  Reviewed chart for potential of harmful drug interactions in the elderly:yes    Objective         Vitals:    10/28/20 1409   Weight: 74.8 kg (165 lb)   Height: 172.7 cm (68\")   PainSc: 0-No pain     Blood pressure was not obtained with this telephone visit due to coronavirus pandemic  Body mass index is 25.09 kg/m².  Discussed the patient's BMI with him. The BMI is in the acceptable range.    Physical Exam    Lab Results   Component Value Date    TRIG 74 08/07/2020    HDL 35 (L) 08/07/2020     08/07/2020    VLDL 14.8 08/07/2020    HGBA1C 5.60 08/07/2020        Assessment/Plan   Medicare Risks and Personalized Health Plan  CMS " Preventative Services Quick Reference  Abdominal Aortic Aneurysm Screening  Immunizations Discussed/Encouraged (specific immunizations; Pneumococcal 23 )    The above risks/problems have been discussed with the patient.  He agrees to AAA screening.  I have ordered the ultrasound.  He will come in for Pneumovax vaccination soon, probably the day that he receives his AAA screen.  He declines Shingrix vaccination at this time.  Pertinent information has been shared with the patient in the After Visit Summary.  Follow up plans and orders are seen below in the Assessment/Plan Section.    Diagnoses and all orders for this visit:    1. Medicare annual wellness visit, initial (Primary)    2. Encounter for hepatitis C screening test for low risk patient  -     Hepatitis C Antibody; Future    3. Need for 23-polyvalent pneumococcal polysaccharide vaccine    4. Screening for AAA (abdominal aortic aneurysm)  -     [place] aaa screen limited; Future      Follow Up:  No follow-ups on file.     An After Visit Summary and PPPS were given to the patient.

## 2020-10-29 ENCOUNTER — CLINICAL SUPPORT (OUTPATIENT)
Dept: FAMILY MEDICINE CLINIC | Facility: CLINIC | Age: 65
End: 2020-10-29

## 2020-10-29 DIAGNOSIS — Z23 NEED FOR PNEUMOCOCCAL VACCINE: Primary | ICD-10-CM

## 2020-10-30 NOTE — PROGRESS NOTES
Subjective   Don Oscar is a 65 y.o. male.     History of Present Illness   Patient reports decreased hearing.  States this is been going on for some time.  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  Reports he was disqualified from  service due to the hole in his eardrum.  Does have a history of occupational noise exposure.  Has trouble understanding people when they speak.  Has not recently had otorrhea but does have a history of it in the past.      The following portions of the patient's history were reviewed and updated as appropriate: allergies, current medications, past family history, past medical history, past social history, past surgical history and problem list.      Don Oscar reports that he quit smoking about 38 years ago. His smoking use included cigarettes. He has a 10.50 pack-year smoking history. He has never used smokeless tobacco. He reports current alcohol use of about 2.0 standard drinks of alcohol per week. He reports that he does not use drugs.  Patient is not a tobacco user and has not been counseled for use of tobacco products    Family History   Problem Relation Age of Onset   • Stroke Mother    • Stroke Father        No Known Allergies      Current Outpatient Medications:   •  albuterol sulfate  (90 Base) MCG/ACT inhaler, Inhale 2 puffs Every 4 (Four) Hours As Needed for Wheezing., Disp: 1 inhaler, Rfl: 11  •  allopurinol (ZYLOPRIM) 100 MG tablet, TAKE 1 TABLET BY MOUTH DAILY, Disp: 90 tablet, Rfl: 0  •  aspirin 81 MG EC tablet, Take 81 mg by mouth Daily., Disp: , Rfl:   •  atorvastatin (LIPITOR) 10 MG tablet, TAKE 1 TABLET BY MOUTH ONCE DAILY FOR CHOLESTEROL, Disp: 30 tablet, Rfl: 11  •  fluticasone (FLONASE) 50 MCG/ACT nasal spray, 1 spray into the nostril(s) as directed by provider 2 (Two) Times a Day. Administer 1 spray in each nostril twice daily., Disp: 1 bottle, Rfl: 11  •  losartan (Cozaar) 50 MG tablet, Take 1 tablet  by mouth 2 (Two) Times a Day., Disp: 180 tablet, Rfl: 3  •  NON FORMULARY, Over the counter cholesterol medication, Disp: , Rfl:   •  ondansetron (ZOFRAN) 4 MG tablet, Take 1 tablet by mouth Every 8 (Eight) Hours As Needed for Nausea or Vomiting., Disp: 20 tablet, Rfl: 0    Past Medical History:   Diagnosis Date   • Abnormal glucose level    • Acute otitis externa    • Acute sinusitis    • Essential hypertension 6/8/2018   • Gout    • Gout    • Gout    • History of colon polyps 1/11/2019   • Mixed hyperlipidemia 6/8/2018   • Secondhand smoke exposure 6/8/2018   • Tinnitus    • Upper respiratory infection        Past Surgical History:   Procedure Laterality Date   • COLONOSCOPY  12/21/2011    1 polyp in colon; removed by cold biopsy polypectomy. Internal & external hemorrhoids found.   • INJECTION OF MEDICATION  12/23/2013    Depo Medrol(2)   • INJECTION OF MEDICATION  08/30/2013    Dexamethasone(1)   • INJECTION OF MEDICATION  05/15/2011    Kenalog(1)         Review of Systems   Constitutional: Negative for fever.   HENT: Positive for ear discharge, ear pain and hearing loss.    Psychiatric/Behavioral:        Not assessed   All other systems reviewed and are negative.          Objective   Physical Exam  General: Well-developed well-nourished male in no acute distress.  Alert and oriented x3. Head: Normocephalic. Face: Symmetrical strength and appearance. PERRL. EOMI. Voice:Strong. Speech: Mild misarticulations  Ears: External ears no deformity, canals no discharge, right tympanic membrane intact with no infection or effusion.  Left tympanic membrane shows a dry central perforation with no evidence of squamous ingrowth or granulation tissue  Nose: Nares show no discharge mass polyp or purulence.  Boggy mucosa is present.  No gross external deformity.  Septum: Midline  Oral cavity: Lips and gums without lesions.  Tongue and floor of mouth without lesions.  Parotid and submandibular ducts unobstructed.  No mucosal  lesions on the buccal mucosa or vestibule of the mouth.  Pharynx: No erythema exudate mass or ulcer  Neck: No lymphadenopathy.  No thyromegaly.  Trachea and larynx midline.  No masses in the parotid or submandibular glands.  Audiogram is obtained and reviewed shows a bilateral mixed hearing loss worse on the left than the right.  The sensorineural thresholds are symmetrical.  Has a large volume tympanogram on the left and a somewhat large volume tympanogram on the right that shows negative pressure.  The left tympanic membrane is mobile which does not match his clinical exam.      Assessment/Plan   Diagnoses and all orders for this visit:    1. Mixed hearing loss, bilateral (Primary)    2. Perforation of left tympanic membrane      Plan: I do not think the patient would be a surgical candidate given that he has a significant conductive component on the right side without a perforation.  He should maintain water precautions.  He could consider amplification if he so desires although I explained that amplification could entail risk of otorrhea particularly on the left.  See me again in a year or as needed for further problems especially if he develops otorrhea.

## 2021-01-25 RX ORDER — ALLOPURINOL 100 MG/1
100 TABLET ORAL DAILY
Qty: 90 TABLET | Refills: 3 | Status: SHIPPED | OUTPATIENT
Start: 2021-01-25 | End: 2022-09-19 | Stop reason: SDUPTHER

## 2021-02-09 ENCOUNTER — LAB (OUTPATIENT)
Dept: LAB | Facility: OTHER | Age: 66
End: 2021-02-09

## 2021-02-09 DIAGNOSIS — Z11.59 ENCOUNTER FOR HEPATITIS C SCREENING TEST FOR LOW RISK PATIENT: ICD-10-CM

## 2021-02-09 DIAGNOSIS — E78.2 MIXED HYPERLIPIDEMIA: Chronic | ICD-10-CM

## 2021-02-09 DIAGNOSIS — I10 ESSENTIAL HYPERTENSION: Chronic | ICD-10-CM

## 2021-02-09 DIAGNOSIS — M1A.9XX0 CHRONIC GOUT WITHOUT TOPHUS, UNSPECIFIED CAUSE, UNSPECIFIED SITE: Chronic | ICD-10-CM

## 2021-02-09 DIAGNOSIS — R73.01 IMPAIRED FASTING GLUCOSE: Chronic | ICD-10-CM

## 2021-02-09 LAB
ALBUMIN SERPL-MCNC: 4 G/DL (ref 3.5–5)
ALBUMIN/GLOB SERPL: 1.3 G/DL (ref 1.1–1.8)
ALP SERPL-CCNC: 100 U/L (ref 38–126)
ALT SERPL W P-5'-P-CCNC: 28 U/L
ANION GAP SERPL CALCULATED.3IONS-SCNC: 6 MMOL/L (ref 5–15)
ARTICHOKE IGE QN: 99 MG/DL (ref 0–100)
AST SERPL-CCNC: 24 U/L (ref 17–59)
BASOPHILS # BLD AUTO: 0.03 10*3/MM3 (ref 0–0.2)
BASOPHILS NFR BLD AUTO: 0.3 % (ref 0–1.5)
BILIRUB SERPL-MCNC: 0.7 MG/DL (ref 0.2–1.3)
BUN SERPL-MCNC: 21 MG/DL (ref 7–23)
BUN/CREAT SERPL: 19.6 (ref 7–25)
CALCIUM SPEC-SCNC: 9.3 MG/DL (ref 8.4–10.2)
CHLORIDE SERPL-SCNC: 100 MMOL/L (ref 101–112)
CO2 SERPL-SCNC: 29 MMOL/L (ref 22–30)
CREAT SERPL-MCNC: 1.07 MG/DL (ref 0.7–1.3)
DEPRECATED RDW RBC AUTO: 39.3 FL (ref 37–54)
EOSINOPHIL # BLD AUTO: 0.13 10*3/MM3 (ref 0–0.4)
EOSINOPHIL NFR BLD AUTO: 1.4 % (ref 0.3–6.2)
ERYTHROCYTE [DISTWIDTH] IN BLOOD BY AUTOMATED COUNT: 12.3 % (ref 12.3–15.4)
GFR SERPL CREATININE-BSD FRML MDRD: 69 ML/MIN/1.73 (ref 49–113)
GLOBULIN UR ELPH-MCNC: 3 GM/DL (ref 2.3–3.5)
GLUCOSE SERPL-MCNC: 103 MG/DL (ref 70–99)
HBA1C MFR BLD: 5.9 % (ref 4.8–5.6)
HCT VFR BLD AUTO: 40.9 % (ref 37.5–51)
HCV AB SER DONR QL: NORMAL
HGB BLD-MCNC: 13.6 G/DL (ref 13–17.7)
LYMPHOCYTES # BLD AUTO: 1.56 10*3/MM3 (ref 0.7–3.1)
LYMPHOCYTES NFR BLD AUTO: 17.4 % (ref 19.6–45.3)
MCH RBC QN AUTO: 30 PG (ref 26.6–33)
MCHC RBC AUTO-ENTMCNC: 33.3 G/DL (ref 31.5–35.7)
MCV RBC AUTO: 90.3 FL (ref 79–97)
MONOCYTES # BLD AUTO: 0.82 10*3/MM3 (ref 0.1–0.9)
MONOCYTES NFR BLD AUTO: 9.1 % (ref 5–12)
NEUTROPHILS NFR BLD AUTO: 6.45 10*3/MM3 (ref 1.7–7)
NEUTROPHILS NFR BLD AUTO: 71.8 % (ref 42.7–76)
PLATELET # BLD AUTO: 332 10*3/MM3 (ref 140–450)
PMV BLD AUTO: 8.7 FL (ref 6–12)
POTASSIUM SERPL-SCNC: 4.5 MMOL/L (ref 3.4–5)
PROT SERPL-MCNC: 7 G/DL (ref 6.3–8.6)
RBC # BLD AUTO: 4.53 10*6/MM3 (ref 4.14–5.8)
SODIUM SERPL-SCNC: 135 MMOL/L (ref 137–145)
URATE SERPL-MCNC: 6.4 MG/DL (ref 3.5–8.5)
WBC # BLD AUTO: 8.99 10*3/MM3 (ref 3.4–10.8)

## 2021-02-09 PROCEDURE — 86803 HEPATITIS C AB TEST: CPT | Performed by: INTERNAL MEDICINE

## 2021-02-09 PROCEDURE — 36415 COLL VENOUS BLD VENIPUNCTURE: CPT | Performed by: INTERNAL MEDICINE

## 2021-02-09 PROCEDURE — 85025 COMPLETE CBC W/AUTO DIFF WBC: CPT | Performed by: INTERNAL MEDICINE

## 2021-02-09 PROCEDURE — 80053 COMPREHEN METABOLIC PANEL: CPT | Performed by: INTERNAL MEDICINE

## 2021-02-09 PROCEDURE — 83721 ASSAY OF BLOOD LIPOPROTEIN: CPT | Performed by: INTERNAL MEDICINE

## 2021-02-09 PROCEDURE — 84550 ASSAY OF BLOOD/URIC ACID: CPT | Performed by: INTERNAL MEDICINE

## 2021-02-09 PROCEDURE — 83036 HEMOGLOBIN GLYCOSYLATED A1C: CPT | Performed by: INTERNAL MEDICINE

## 2021-02-17 NOTE — PROGRESS NOTES
"You have chosen to receive care through a telephone visit. Do you consent to use a telephone visit for your medical care today? Yes   Total visit time: 34 min    Chief Complaint  Hyperlipidemia, Hypertension, Hyperglycemia, Hearing Loss, and Gout (right ankle)    Subjective          History of Present Illness     Don Oscar receives care via telephone visit through [facility] for 6-month follow up on hyperlipidemia, hypertension, and impaired fasting glucose, among other issues. He continues on prophylactic allopurinol for gout.     Despite uric acid at goal, he reports 2 weeks of gout sxs R ankle, waxing and waning.    We referred patient to Dr. Walsh. ENT, to address hearing loss.   Dr. Walsh did not think the patient would be a surgical candidate given that he has a significant conductive component on the right side without a perforation.  He  explained that amplification could entail risk of otorrhea particularly on the left and recommended repeat visit in a year or sooner if he develops otorrhea. He has purchased hearing aides and very pleased with them.     IFG has progressed with A1c 5.9,  increased from 5.6. He is trying to reduce sugar intake. We educated on carbs and activity.    The patient's relevant past medical, surgical, and social history was reviewed in Epic.   Lab results are reviewed with the patient today. CBC unremarkable.  Fasting glucose 103. A1c 5.9.  LDL cholesterol 99.  Normal renal and liver function.         Objective   Vital Signs:   Ht 172.7 cm (68\")   Wt 74.8 kg (165 lb)   BMI 25.09 kg/m²     Physical Exam   Result Review :     CMP    CMP 8/7/20 9/13/20 2/9/21   Glucose 111 (A) 145 (A) 103 (A)   BUN 18 15 21   Creatinine 1.05 1.07 1.07   eGFR Non African Am 71 69 69   Sodium 136 (A) 138 135 (A)   Potassium 4.2 4.1 4.5   Chloride 102 104 100 (A)   Calcium 9.3 9.2 9.3   Albumin 3.80 4.00 4.00   Total Bilirubin 0.5 0.6 0.7   Alkaline Phosphatase 65 81 " 100   AST (SGOT) 25 17 24   ALT (SGPT) 36 21 28   (A) Abnormal value            CBC w/diff    CBC w/Diff 8/7/20 9/13/20 2/9/21   WBC 6.41 13.20 (A) 8.99   RBC 4.46 5.02 4.53   Hemoglobin 13.7 15.5 13.6   Hematocrit 40.8 45.4 40.9   MCV 91.5 90.4 90.3   MCH 30.7 30.9 30.0   MCHC 33.6 34.1 33.3   RDW 12.6 12.1 (A) 12.3   Platelets 242 348 332   Neutrophil Rel % 68.6 91.4 (A) 71.8   Immature Granulocyte Rel %  0.4    Lymphocyte Rel % 21.4 5.8 (A) 17.4 (A)   Monocyte Rel % 7.6 2.2 (A) 9.1   Eosinophil Rel % 2.2 0.0 (A) 1.4   Basophil Rel % 0.2 0.2 0.3   (A) Abnormal value            Lipid Panel    Lipid Panel 8/7/20 2/9/21   Total Cholesterol 150    Triglycerides 74    HDL Cholesterol 35 (A)    VLDL Cholesterol 14.8    LDL Cholesterol  100 99   LDL/HDL Ratio 2.86    (A) Abnormal value            TSH    TSH 8/7/20   TSH 2.020           A1C Last 3 Results    HGBA1C Last 3 Results 8/7/20 2/9/21   Hemoglobin A1C 5.60 5.90 (A)   (A) Abnormal value            PSA    PSA 8/7/20   PSA 0.693           Data reviewed: Consultant notes Dr Walsh, ENT 2020          Assessment and Plan    Diagnoses and all orders for this visit:    1. Mixed hyperlipidemia (Primary)  -     CBC Auto Differential; Future  -     Comprehensive Metabolic Panel; Future  -     Lipid Panel; Future  -     TSH; Future    2. Essential hypertension    3. Impaired fasting glucose  -     Hemoglobin A1c; Future    4. Acute idiopathic gout of right ankle  -     Uric Acid; Future    5. Sensorineural hearing loss (SNHL) of both ears    6. Special screening for malignant neoplasm of prostate  -     PSA Screen; Future    7. Chronic gout without tophus, unspecified cause, unspecified site  -     Uric Acid; Future    Other orders  -     colchicine 0.6 MG tablet; Take 1 tablet by mouth Daily for 7 days. For gout attack  Dispense: 7 tablet; Refill: 0  -     meloxicam (MOBIC) 15 MG tablet; Take 1 tablet by mouth Daily As Needed (pain) for up to 7 days. For gout attack. Take  with food  Dispense: 7 tablet; Refill: 0    Continue lipitor and diet. LDL is at goal.    Continue losartan and sodium restriction. Drop by next week for a BP check as he has no monitor at home.    Continue diabetic-style diet, which I reviewed today.    Treat the acute gout flare with mobic and colchicine.  Continue the allopurinol prophylaxis    Continue the hearing aides.  We discussed ways to reduce complications.    RTC 6 mo with fasting labs prior.           Follow Up   Return in about 6 months (around 8/18/2021) for Next scheduled follow up - labs 1 week prior.  Patient was given instructions and counseling regarding his condition or for health maintenance advice. Please see specific information pulled into the AVS if appropriate.

## 2021-02-18 ENCOUNTER — OFFICE VISIT (OUTPATIENT)
Dept: FAMILY MEDICINE CLINIC | Facility: CLINIC | Age: 66
End: 2021-02-18

## 2021-02-18 VITALS — BODY MASS INDEX: 25.01 KG/M2 | WEIGHT: 165 LBS | HEIGHT: 68 IN

## 2021-02-18 DIAGNOSIS — Z12.5 SPECIAL SCREENING FOR MALIGNANT NEOPLASM OF PROSTATE: ICD-10-CM

## 2021-02-18 DIAGNOSIS — E78.2 MIXED HYPERLIPIDEMIA: Primary | Chronic | ICD-10-CM

## 2021-02-18 DIAGNOSIS — M1A.9XX0 CHRONIC GOUT WITHOUT TOPHUS, UNSPECIFIED CAUSE, UNSPECIFIED SITE: ICD-10-CM

## 2021-02-18 DIAGNOSIS — M10.071 ACUTE IDIOPATHIC GOUT OF RIGHT ANKLE: Chronic | ICD-10-CM

## 2021-02-18 DIAGNOSIS — I10 ESSENTIAL HYPERTENSION: Chronic | ICD-10-CM

## 2021-02-18 DIAGNOSIS — R73.01 IMPAIRED FASTING GLUCOSE: Chronic | ICD-10-CM

## 2021-02-18 DIAGNOSIS — H90.3 SENSORINEURAL HEARING LOSS (SNHL) OF BOTH EARS: Chronic | ICD-10-CM

## 2021-02-18 PROCEDURE — G2025 DIS SITE TELE SVCS RHC/FQHC: HCPCS | Performed by: INTERNAL MEDICINE

## 2021-02-18 RX ORDER — COLCHICINE 0.6 MG/1
0.6 TABLET ORAL DAILY
Qty: 7 TABLET | Refills: 0 | Status: SHIPPED | OUTPATIENT
Start: 2021-02-18 | End: 2021-02-25

## 2021-02-18 RX ORDER — MELOXICAM 15 MG/1
15 TABLET ORAL DAILY PRN
Qty: 7 TABLET | Refills: 0 | Status: SHIPPED | OUTPATIENT
Start: 2021-02-18 | End: 2021-02-25

## 2021-02-18 NOTE — PATIENT INSTRUCTIONS
Diabetes Mellitus and Exercise  Exercising regularly is important for your overall health, especially when you have diabetes (diabetes mellitus). Exercising is not only about losing weight. It has many other health benefits, such as increasing muscle strength and bone density and reducing body fat and stress. This leads to improved fitness, flexibility, and endurance, all of which result in better overall health.  Exercise has additional benefits for people with diabetes, including:  · Reducing appetite.  · Helping to lower and control blood glucose.  · Lowering blood pressure.  · Helping to control amounts of fatty substances (lipids) in the blood, such as cholesterol and triglycerides.  · Helping the body to respond better to insulin (improving insulin sensitivity).  · Reducing how much insulin the body needs.  · Decreasing the risk for heart disease by:  ? Lowering cholesterol and triglyceride levels.  ? Increasing the levels of good cholesterol.  ? Lowering blood glucose levels.  What is my activity plan?  Your health care provider or certified diabetes educator can help you make a plan for the type and frequency of exercise (activity plan) that works for you. Make sure that you:  · Do at least 150 minutes of moderate-intensity or vigorous-intensity exercise each week. This could be brisk walking, biking, or water aerobics.  ? Do stretching and strength exercises, such as yoga or weightlifting, at least 2 times a week.  ? Spread out your activity over at least 3 days of the week.  · Get some form of physical activity every day.  ? Do not go more than 2 days in a row without some kind of physical activity.  ? Avoid being inactive for more than 30 minutes at a time. Take frequent breaks to walk or stretch.  · Choose a type of exercise or activity that you enjoy, and set realistic goals.  · Start slowly, and gradually increase the intensity of your exercise over time.  What do I need to know about managing my  diabetes?    · Check your blood glucose before and after exercising.  ? If your blood glucose is 240 mg/dL (13.3 mmol/L) or higher before you exercise, check your urine for ketones. If you have ketones in your urine, do not exercise until your blood glucose returns to normal.  ? If your blood glucose is 100 mg/dL (5.6 mmol/L) or lower, eat a snack containing 15-20 grams of carbohydrate. Check your blood glucose 15 minutes after the snack to make sure that your level is above 100 mg/dL (5.6 mmol/L) before you start your exercise.  · Know the symptoms of low blood glucose (hypoglycemia) and how to treat it. Your risk for hypoglycemia increases during and after exercise. Common symptoms of hypoglycemia can include:  ? Hunger.  ? Anxiety.  ? Sweating and feeling clammy.  ? Confusion.  ? Dizziness or feeling light-headed.  ? Increased heart rate or palpitations.  ? Blurry vision.  ? Tingling or numbness around the mouth, lips, or tongue.  ? Tremors or shakes.  ? Irritability.  · Keep a rapid-acting carbohydrate snack available before, during, and after exercise to help prevent or treat hypoglycemia.  · Avoid injecting insulin into areas of the body that are going to be exercised. For example, avoid injecting insulin into:  ? The arms, when playing tennis.  ? The legs, when jogging.  · Keep records of your exercise habits. Doing this can help you and your health care provider adjust your diabetes management plan as needed. Write down:  ? Food that you eat before and after you exercise.  ? Blood glucose levels before and after you exercise.  ? The type and amount of exercise you have done.  ? When your insulin is expected to peak, if you use insulin. Avoid exercising at times when your insulin is peaking.  · When you start a new exercise or activity, work with your health care provider to make sure the activity is safe for you, and to adjust your insulin, medicines, or food intake as needed.  · Drink plenty of water while  you exercise to prevent dehydration or heat stroke. Drink enough fluid to keep your urine clear or pale yellow.  Summary  · Exercising regularly is important for your overall health, especially when you have diabetes (diabetes mellitus).  · Exercising has many health benefits, such as increasing muscle strength and bone density and reducing body fat and stress.  · Your health care provider or certified diabetes educator can help you make a plan for the type and frequency of exercise (activity plan) that works for you.  · When you start a new exercise or activity, work with your health care provider to make sure the activity is safe for you, and to adjust your insulin, medicines, or food intake as needed.  This information is not intended to replace advice given to you by your health care provider. Make sure you discuss any questions you have with your health care provider.  Document Revised: 07/12/2018 Document Reviewed: 05/29/2017  [employer] Patient Education © 2020 [employer]      Calorie Counting for Weight Loss  Calories are units of energy. Your body needs a certain amount of calories from food to keep you going throughout the day. When you eat more calories than your body needs, your body stores the extra calories as fat. When you eat fewer calories than your body needs, your body burns fat to get the energy it needs.  Calorie counting means keeping track of how many calories you eat and drink each day. Calorie counting can be helpful if you need to lose weight. If you make sure to eat fewer calories than your body needs, you should lose weight. Ask your health care provider what a healthy weight is for you.  For calorie counting to work, you will need to eat the right number of calories in a day in order to lose a healthy amount of weight per week. A dietitian can help you determine how many calories you need in a day and will give you suggestions on how to reach your calorie goal.  · A healthy amount of  weight to lose per week is usually 1-2 lb (0.5-0.9 kg). This usually means that your daily calorie intake should be reduced by 500-750 calories.  · Eating 1,200 - 1,500 calories per day can help most women lose weight.  · Eating 1,500 - 1,800 calories per day can help most men lose weight.  What is my plan?  My goal is to have __________ calories per day.  If I have this many calories per day, I should lose around __________ pounds per week.  What do I need to know about calorie counting?  In order to meet your daily calorie goal, you will need to:  · Find out how many calories are in each food you would like to eat. Try to do this before you eat.  · Decide how much of the food you plan to eat.  · Write down what you ate and how many calories it had. Doing this is called keeping a food log.  To successfully lose weight, it is important to balance calorie counting with a healthy lifestyle that includes regular activity. Aim for 150 minutes of moderate exercise (such as walking) or 75 minutes of vigorous exercise (such as running) each week.  Where do I find calorie information?    The number of calories in a food can be found on a Nutrition Facts label. If a food does not have a Nutrition Facts label, try to look up the calories online or ask your dietitian for help.  Remember that calories are listed per serving. If you choose to have more than one serving of a food, you will have to multiply the calories per serving by the amount of servings you plan to eat. For example, the label on a package of bread might say that a serving size is 1 slice and that there are 90 calories in a serving. If you eat 1 slice, you will have eaten 90 calories. If you eat 2 slices, you will have eaten 180 calories.  How do I keep a food log?  Immediately after each meal, record the following information in your food log:  · What you ate. Don't forget to include toppings, sauces, and other extras on the food.  · How much you ate. This can  "be measured in cups, ounces, or number of items.  · How many calories each food and drink had.  · The total number of calories in the meal.  Keep your food log near you, such as in a small notebook in your pocket, or use a mobile bhavana or website. Some programs will calculate calories for you and show you how many calories you have left for the day to meet your goal.  What are some calorie counting tips?    · Use your calories on foods and drinks that will fill you up and not leave you hungry:  ? Some examples of foods that fill you up are nuts and nut butters, vegetables, lean proteins, and high-fiber foods like whole grains. High-fiber foods are foods with more than 5 g fiber per serving.  ? Drinks such as sodas, specialty coffee drinks, alcohol, and juices have a lot of calories, yet do not fill you up.  · Eat nutritious foods and avoid empty calories. Empty calories are calories you get from foods or beverages that do not have many vitamins or protein, such as candy, sweets, and soda. It is better to have a nutritious high-calorie food (such as an avocado) than a food with few nutrients (such as a bag of chips).  · Know how many calories are in the foods you eat most often. This will help you calculate calorie counts faster.  · Pay attention to calories in drinks. Low-calorie drinks include water and unsweetened drinks.  · Pay attention to nutrition labels for \"low fat\" or \"fat free\" foods. These foods sometimes have the same amount of calories or more calories than the full fat versions. They also often have added sugar, starch, or salt, to make up for flavor that was removed with the fat.  · Find a way of tracking calories that works for you. Get creative. Try different apps or programs if writing down calories does not work for you.  What are some portion control tips?  · Know how many calories are in a serving. This will help you know how many servings of a certain food you can have.  · Use a measuring cup to " measure serving sizes. You could also try weighing out portions on a kitchen scale. With time, you will be able to estimate serving sizes for some foods.  · Take some time to put servings of different foods on your favorite plates, bowls, and cups so you know what a serving looks like.  · Try not to eat straight from a bag or box. Doing this can lead to overeating. Put the amount you would like to eat in a cup or on a plate to make sure you are eating the right portion.  · Use smaller plates, glasses, and bowls to prevent overeating.  · Try not to multitask (for example, watch TV or use your computer) while eating. If it is time to eat, sit down at a table and enjoy your food. This will help you to know when you are full. It will also help you to be aware of what you are eating and how much you are eating.  What are tips for following this plan?  Reading food labels  · Check the calorie count compared to the serving size. The serving size may be smaller than what you are used to eating.  · Check the source of the calories. Make sure the food you are eating is high in vitamins and protein and low in saturated and trans fats.  Shopping  · Read nutrition labels while you shop. This will help you make healthy decisions before you decide to purchase your food.  · Make a grocery list and stick to it.  Cooking  · Try to cook your favorite foods in a healthier way. For example, try baking instead of frying.  · Use low-fat dairy products.  Meal planning  · Use more fruits and vegetables. Half of your plate should be fruits and vegetables.  · Include lean proteins like poultry and fish.  How do I count calories when eating out?  · Ask for smaller portion sizes.  · Consider sharing an entree and sides instead of getting your own entree.  · If you get your own entree, eat only half. Ask for a box at the beginning of your meal and put the rest of your entree in it so you are not tempted to eat it.  · If calories are listed on  "the menu, choose the lower calorie options.  · Choose dishes that include vegetables, fruits, whole grains, low-fat dairy products, and lean protein.  · Choose items that are boiled, broiled, grilled, or steamed. Stay away from items that are buttered, battered, fried, or served with cream sauce. Items labeled \"crispy\" are usually fried, unless stated otherwise.  · Choose water, low-fat milk, unsweetened iced tea, or other drinks without added sugar. If you want an alcoholic beverage, choose a lower calorie option such as a glass of wine or light beer.  · Ask for dressings, sauces, and syrups on the side. These are usually high in calories, so you should limit the amount you eat.  · If you want a salad, choose a garden salad and ask for grilled meats. Avoid extra toppings like kat, cheese, or fried items. Ask for the dressing on the side, or ask for olive oil and vinegar or lemon to use as dressing.  · Estimate how many servings of a food you are given. For example, a serving of cooked rice is ½ cup or about the size of half a baseball. Knowing serving sizes will help you be aware of how much food you are eating at restaurants. The list below tells you how big or small some common portion sizes are based on everyday objects:  ? 1 oz--4 stacked dice.  ? 3 oz--1 deck of cards.  ? 1 tsp--1 die.  ? 1 Tbsp--½ a ping-pong ball.  ? 2 Tbsp--1 ping-pong ball.  ? ½ cup--½ baseball.  ? 1 cup--1 baseball.  Summary  · Calorie counting means keeping track of how many calories you eat and drink each day. If you eat fewer calories than your body needs, you should lose weight.  · A healthy amount of weight to lose per week is usually 1-2 lb (0.5-0.9 kg). This usually means reducing your daily calorie intake by 500-750 calories.  · The number of calories in a food can be found on a Nutrition Facts label. If a food does not have a Nutrition Facts label, try to look up the calories online or ask your dietitian for help.  · Use your " calories on foods and drinks that will fill you up, and not on foods and drinks that will leave you hungry.  · Use smaller plates, glasses, and bowls to prevent overeating.  This information is not intended to replace advice given to you by your health care provider. Make sure you discuss any questions you have with your health care provider.  Document Revised: 09/06/2019 Document Reviewed: 11/17/2017  [employer] Patient Education © 2020 [employer]

## 2021-08-18 ENCOUNTER — LAB (OUTPATIENT)
Dept: LAB | Facility: OTHER | Age: 66
End: 2021-08-18

## 2021-08-18 DIAGNOSIS — M1A.9XX0 CHRONIC GOUT WITHOUT TOPHUS, UNSPECIFIED CAUSE, UNSPECIFIED SITE: ICD-10-CM

## 2021-08-18 DIAGNOSIS — M10.071 ACUTE IDIOPATHIC GOUT OF RIGHT ANKLE: Chronic | ICD-10-CM

## 2021-08-18 DIAGNOSIS — E78.2 MIXED HYPERLIPIDEMIA: Chronic | ICD-10-CM

## 2021-08-18 DIAGNOSIS — R73.01 IMPAIRED FASTING GLUCOSE: Chronic | ICD-10-CM

## 2021-08-18 DIAGNOSIS — Z12.5 SPECIAL SCREENING FOR MALIGNANT NEOPLASM OF PROSTATE: ICD-10-CM

## 2021-08-18 LAB
ALBUMIN SERPL-MCNC: 4.2 G/DL (ref 3.5–5)
ALBUMIN/GLOB SERPL: 1.5 G/DL (ref 1.1–1.8)
ALP SERPL-CCNC: 74 U/L (ref 38–126)
ALT SERPL W P-5'-P-CCNC: 33 U/L
ANION GAP SERPL CALCULATED.3IONS-SCNC: 5 MMOL/L (ref 5–15)
AST SERPL-CCNC: 27 U/L (ref 17–59)
BASOPHILS # BLD AUTO: 0.03 10*3/MM3 (ref 0–0.2)
BASOPHILS NFR BLD AUTO: 0.5 % (ref 0–1.5)
BILIRUB SERPL-MCNC: 0.5 MG/DL (ref 0.2–1.3)
BUN SERPL-MCNC: 18 MG/DL (ref 7–23)
BUN/CREAT SERPL: 16.2 (ref 7–25)
CALCIUM SPEC-SCNC: 9.2 MG/DL (ref 8.4–10.2)
CHLORIDE SERPL-SCNC: 108 MMOL/L (ref 101–112)
CHOLEST SERPL-MCNC: 153 MG/DL (ref 150–200)
CO2 SERPL-SCNC: 25 MMOL/L (ref 22–30)
CREAT SERPL-MCNC: 1.11 MG/DL (ref 0.7–1.3)
DEPRECATED RDW RBC AUTO: 42.5 FL (ref 37–54)
EOSINOPHIL # BLD AUTO: 0.19 10*3/MM3 (ref 0–0.4)
EOSINOPHIL NFR BLD AUTO: 2.9 % (ref 0.3–6.2)
ERYTHROCYTE [DISTWIDTH] IN BLOOD BY AUTOMATED COUNT: 12.9 % (ref 12.3–15.4)
GFR SERPL CREATININE-BSD FRML MDRD: 66 ML/MIN/1.73 (ref 49–113)
GLOBULIN UR ELPH-MCNC: 2.8 GM/DL (ref 2.3–3.5)
GLUCOSE SERPL-MCNC: 115 MG/DL (ref 70–99)
HBA1C MFR BLD: 5.57 % (ref 4.8–5.6)
HCT VFR BLD AUTO: 43 % (ref 37.5–51)
HDLC SERPL-MCNC: 38 MG/DL (ref 40–59)
HGB BLD-MCNC: 14.9 G/DL (ref 13–17.7)
LDLC SERPL CALC-MCNC: 103 MG/DL
LDLC/HDLC SERPL: 2.71 {RATIO} (ref 0–3.55)
LYMPHOCYTES # BLD AUTO: 1.27 10*3/MM3 (ref 0.7–3.1)
LYMPHOCYTES NFR BLD AUTO: 19.4 % (ref 19.6–45.3)
MCH RBC QN AUTO: 31.8 PG (ref 26.6–33)
MCHC RBC AUTO-ENTMCNC: 34.7 G/DL (ref 31.5–35.7)
MCV RBC AUTO: 91.9 FL (ref 79–97)
MONOCYTES # BLD AUTO: 0.66 10*3/MM3 (ref 0.1–0.9)
MONOCYTES NFR BLD AUTO: 10.1 % (ref 5–12)
NEUTROPHILS NFR BLD AUTO: 4.41 10*3/MM3 (ref 1.7–7)
NEUTROPHILS NFR BLD AUTO: 67.1 % (ref 42.7–76)
PLATELET # BLD AUTO: 255 10*3/MM3 (ref 140–450)
PMV BLD AUTO: 8.9 FL (ref 6–12)
POTASSIUM SERPL-SCNC: 4.5 MMOL/L (ref 3.4–5)
PROT SERPL-MCNC: 7 G/DL (ref 6.3–8.6)
PSA SERPL-MCNC: 0.62 NG/ML (ref 0–4)
RBC # BLD AUTO: 4.68 10*6/MM3 (ref 4.14–5.8)
SODIUM SERPL-SCNC: 138 MMOL/L (ref 137–145)
TRIGL SERPL-MCNC: 61 MG/DL
TSH SERPL DL<=0.05 MIU/L-ACNC: 1.58 UIU/ML (ref 0.27–4.2)
URATE SERPL-MCNC: 7.4 MG/DL (ref 3.5–8.5)
VLDLC SERPL-MCNC: 12 MG/DL (ref 5–40)
WBC # BLD AUTO: 6.56 10*3/MM3 (ref 3.4–10.8)

## 2021-08-18 PROCEDURE — 83036 HEMOGLOBIN GLYCOSYLATED A1C: CPT | Performed by: INTERNAL MEDICINE

## 2021-08-18 PROCEDURE — 80061 LIPID PANEL: CPT | Performed by: INTERNAL MEDICINE

## 2021-08-18 PROCEDURE — 84550 ASSAY OF BLOOD/URIC ACID: CPT | Performed by: INTERNAL MEDICINE

## 2021-08-18 PROCEDURE — 84443 ASSAY THYROID STIM HORMONE: CPT | Performed by: INTERNAL MEDICINE

## 2021-08-18 PROCEDURE — 36415 COLL VENOUS BLD VENIPUNCTURE: CPT | Performed by: INTERNAL MEDICINE

## 2021-08-18 PROCEDURE — 80053 COMPREHEN METABOLIC PANEL: CPT | Performed by: INTERNAL MEDICINE

## 2021-08-18 PROCEDURE — 85025 COMPLETE CBC W/AUTO DIFF WBC: CPT | Performed by: INTERNAL MEDICINE

## 2021-08-18 PROCEDURE — G0103 PSA SCREENING: HCPCS | Performed by: INTERNAL MEDICINE

## 2021-08-23 NOTE — PROGRESS NOTES
"Chief Complaint  Hyperlipidemia (6 month follow up, Review labs ), Hypertension, and Impaired fasting glucose    Subjective          History of Present Illness     Don Oscar presents to the clinic for 6-month follow up on hyperlipidemia, hypertension, gout and impaired fasting glucose, among other issues. He continues on prophylactic allopurinol for gout.   He denies any recent gout attack.  He is quite hard of hearing due to sensorineural hearing loss.  He can understand me as long as I am speaking in front of him.  He is not wearing his hearing aids today, for some reason.    Labs continue to reveal impaired fasting glucose, although, not progressed to diabetes.  Triglycerides and glucose is higher.  He reports he has been less compliant with diabetic style diet.  His weight is up 13 pounds in the past 6 months.  He is not meeting exercise goals.  BMI is now 27.    He has been fully vaccinated for COVID with his 2nd vaccine 04/06/2021.  He has many unvaccinated family members and we discussed the risk but that presents.  He agrees to get the Covid booster vaccination as soon as it is available.    The patient's relevant past medical, surgical, and social history was reviewed in Epic.   Lab results are reviewed with the patient today. CBC unremarkable.  Fasting glucose 115. A1c 5.57. Normal renal and liver function.  Normal thyroid screen.  LDL slightly above goal at 103.  HDL low at 38.   PSA reveals no evidence of prostate cancer.        Objective   Vital Signs:   /84 (BP Location: Left arm, Patient Position: Sitting, Cuff Size: Adult)   Pulse 57 Comment: regular  Temp 98 °F (36.7 °C) (Tympanic)   Ht 172.7 cm (68\")   Wt 80.7 kg (178 lb)   SpO2 97%   BMI 27.06 kg/m²       Physical Exam  Vitals reviewed.   Constitutional:       General: He is not in acute distress.     Appearance: Normal appearance. He is well-developed.      Comments: Very pleasant, hard of hearing.   HENT:      Head: " Normocephalic and atraumatic.      Nose:      Right Sinus: No maxillary sinus tenderness or frontal sinus tenderness.      Left Sinus: No maxillary sinus tenderness or frontal sinus tenderness.      Comments: Mouth and nose covered by mask     Mouth/Throat:      Mouth: No oral lesions.      Pharynx: Uvula midline.      Tonsils: No tonsillar exudate.   Eyes:      Conjunctiva/sclera: Conjunctivae normal.      Pupils: Pupils are equal, round, and reactive to light.   Neck:      Thyroid: No thyroid mass or thyromegaly.      Vascular: No carotid bruit or JVD.      Trachea: Trachea normal. No tracheal deviation.   Cardiovascular:      Rate and Rhythm: Normal rate and regular rhythm.  No extrasystoles are present.     Chest Wall: PMI is not displaced.      Heart sounds: Normal heart sounds. No murmur heard.        Comments: Peripheral pulses 1+ symmetrically  Pulmonary:      Effort: Pulmonary effort is normal. No accessory muscle usage or respiratory distress.      Breath sounds: Normal breath sounds. No decreased breath sounds, wheezing, rhonchi or rales.   Abdominal:      General: Bowel sounds are normal. There is no distension.      Palpations: Abdomen is soft.      Tenderness: There is no abdominal tenderness.      Comments: Overweight abdomen   Musculoskeletal:      Cervical back: Neck supple.      Right lower leg: No edema.      Left lower leg: No edema.   Lymphadenopathy:      Cervical: No cervical adenopathy.   Skin:     General: Skin is warm and dry.      Findings: No rash.      Nails: There is no clubbing.   Neurological:      General: No focal deficit present.      Mental Status: He is alert and oriented to person, place, and time. Mental status is at baseline.      Cranial Nerves: No cranial nerve deficit.      Coordination: Coordination normal.   Psychiatric:         Mood and Affect: Mood normal.         Speech: Speech normal.         Behavior: Behavior normal.         Thought Content: Thought content normal.          Judgment: Judgment normal.              Result Review :     CMP    CMP 9/13/20 2/9/21 8/18/21   Glucose 145 (A) 103 (A) 115 (A)   BUN 15 21 18   Creatinine 1.07 1.07 1.11   eGFR Non African Am 69 69 66   Sodium 138 135 (A) 138   Potassium 4.1 4.5 4.5   Chloride 104 100 (A) 108   Calcium 9.2 9.3 9.2   Albumin 4.00 4.00 4.20   Total Bilirubin 0.6 0.7 0.5   Alkaline Phosphatase 81 100 74   AST (SGOT) 17 24 27   ALT (SGPT) 21 28 33   (A) Abnormal value            CBC w/diff    CBC w/Diff 9/13/20 2/9/21 8/18/21   WBC 13.20 (A) 8.99 6.56   RBC 5.02 4.53 4.68   Hemoglobin 15.5 13.6 14.9   Hematocrit 45.4 40.9 43.0   MCV 90.4 90.3 91.9   MCH 30.9 30.0 31.8   MCHC 34.1 33.3 34.7   RDW 12.1 (A) 12.3 12.9   Platelets 348 332 255   Neutrophil Rel % 91.4 (A) 71.8 67.1   Immature Granulocyte Rel % 0.4     Lymphocyte Rel % 5.8 (A) 17.4 (A) 19.4 (A)   Monocyte Rel % 2.2 (A) 9.1 10.1   Eosinophil Rel % 0.0 (A) 1.4 2.9   Basophil Rel % 0.2 0.3 0.5   (A) Abnormal value            Lipid Panel    Lipid Panel 2/9/21 8/18/21   Total Cholesterol  153   Triglycerides  61   HDL Cholesterol  38 (A)   VLDL Cholesterol  12   LDL Cholesterol  99 103 (A)   LDL/HDL Ratio  2.71   (A) Abnormal value            TSH    TSH 8/18/21   TSH 1.580           A1C Last 3 Results    HGBA1C Last 3 Results 2/9/21 8/18/21   Hemoglobin A1C 5.90 (A) 5.57   (A) Abnormal value            PSA    PSA 8/18/21   PSA 0.624                     Assessment and Plan    Diagnoses and all orders for this visit:    1. Mixed hyperlipidemia (Primary)  -     CBC Auto Differential; Future  -     Comprehensive Metabolic Panel; Future  -     LDL Cholesterol, Direct; Future    2. Essential hypertension  -     Comprehensive Metabolic Panel; Future    3. Impaired fasting glucose  -     Comprehensive Metabolic Panel; Future  -     Hemoglobin A1c; Future    4. Acute idiopathic gout of right ankle  -     Uric Acid; Future    5. Sensorineural hearing loss (SNHL) of both  ears    Continue Lipitor.  Try to reduce carbohydrates in the diet and increase physical activity/exercise.  We discussed the possibility of increasing the dose of Lipitor or making other medication alterations in 6 months if the worsened trend continues.  Try to lose 10 to 15 pounds in the next 6 months.    Continue the losartan.  Pursue sodium restriction and weight loss.  We are using losartan due to his history of gout.  Blood pressure is adequately controlled.    We reinforced diabetic diet, exercise, and weight loss goals to delay progression of IFG.    Continue the low-dose allopurinol 100 daily as gout prophylaxis, which has been successful.    I was able to communicate adequately despite the fact that he did not wear his hearing aids today, as long as I remain in front of him, and avoid speaking to him from the side.    Return to clinic in 6 months with fasting labs prior, sooner if needed.    I spent 34 minutes caring for Don on this date of service. This time includes time spent by me in the following activities:preparing for the visit, reviewing tests, obtaining and/or reviewing a separately obtained history, performing a medically appropriate examination and/or evaluation , counseling and educating the patient/family/caregiver, ordering medications, tests, or procedures, documenting information in the medical record and independently interpreting results and communicating that information with the patient/family/caregiver       Follow Up   Return in about 6 months (around 2/26/2022) for Next scheduled follow up - labs 1 week prior.  Patient was given instructions and counseling regarding his condition or for health maintenance advice. Please see specific information pulled into the AVS if appropriate.

## 2021-08-26 ENCOUNTER — OFFICE VISIT (OUTPATIENT)
Dept: FAMILY MEDICINE CLINIC | Facility: CLINIC | Age: 66
End: 2021-08-26

## 2021-08-26 VITALS
SYSTOLIC BLOOD PRESSURE: 138 MMHG | HEART RATE: 57 BPM | DIASTOLIC BLOOD PRESSURE: 84 MMHG | WEIGHT: 178 LBS | HEIGHT: 68 IN | OXYGEN SATURATION: 97 % | TEMPERATURE: 98 F | BODY MASS INDEX: 26.98 KG/M2

## 2021-08-26 DIAGNOSIS — E78.2 MIXED HYPERLIPIDEMIA: Primary | Chronic | ICD-10-CM

## 2021-08-26 DIAGNOSIS — R73.01 IMPAIRED FASTING GLUCOSE: Chronic | ICD-10-CM

## 2021-08-26 DIAGNOSIS — M10.071 ACUTE IDIOPATHIC GOUT OF RIGHT ANKLE: Chronic | ICD-10-CM

## 2021-08-26 DIAGNOSIS — I10 ESSENTIAL HYPERTENSION: Chronic | ICD-10-CM

## 2021-08-26 DIAGNOSIS — H90.3 SENSORINEURAL HEARING LOSS (SNHL) OF BOTH EARS: Chronic | ICD-10-CM

## 2021-08-26 PROCEDURE — 99214 OFFICE O/P EST MOD 30 MIN: CPT | Performed by: INTERNAL MEDICINE

## 2021-08-26 NOTE — PATIENT INSTRUCTIONS

## 2021-09-24 RX ORDER — FLUTICASONE PROPIONATE 50 MCG
SPRAY, SUSPENSION (ML) NASAL
Qty: 16 G | Refills: 5 | Status: SHIPPED | OUTPATIENT
Start: 2021-09-24 | End: 2023-01-20

## 2021-10-18 RX ORDER — ATORVASTATIN CALCIUM 10 MG/1
TABLET, FILM COATED ORAL
Qty: 90 TABLET | Refills: 3 | Status: SHIPPED | OUTPATIENT
Start: 2021-10-18 | End: 2022-01-28 | Stop reason: SDUPTHER

## 2021-10-18 RX ORDER — LOSARTAN POTASSIUM 50 MG/1
TABLET ORAL
Qty: 180 TABLET | Refills: 3 | Status: SHIPPED | OUTPATIENT
Start: 2021-10-18 | End: 2022-11-23

## 2022-01-17 ENCOUNTER — OFFICE VISIT (OUTPATIENT)
Dept: FAMILY MEDICINE CLINIC | Facility: CLINIC | Age: 67
End: 2022-01-17

## 2022-01-17 VITALS — BODY MASS INDEX: 26.98 KG/M2 | WEIGHT: 178 LBS | HEIGHT: 68 IN

## 2022-01-17 DIAGNOSIS — Z23 NEED FOR COVID-19 VACCINE: ICD-10-CM

## 2022-01-17 DIAGNOSIS — Z00.00 MEDICARE ANNUAL WELLNESS VISIT, SUBSEQUENT: Primary | ICD-10-CM

## 2022-01-17 PROCEDURE — 1159F MED LIST DOCD IN RCRD: CPT | Performed by: INTERNAL MEDICINE

## 2022-01-17 PROCEDURE — G0439 PPPS, SUBSEQ VISIT: HCPCS | Performed by: INTERNAL MEDICINE

## 2022-01-17 NOTE — PROGRESS NOTES
You have chosen to receive care through a telephone visit. Do you consent to use a telephone visit for your medical care today? Yes    The ABCs of the Annual Wellness Visit  Subsequent Medicare Wellness Visit    Chief Complaint   Patient presents with   • Medicare Wellness-subsequent      Subjective    History of Present Illness:  Don Oscar is a 66 y.o. male who presents for a Subsequent Medicare Wellness Visit.    The following portions of the patient's history were reviewed and   updated as appropriate:   He  has a past medical history of Abnormal glucose level, Acute otitis externa, Acute sinusitis, Essential hypertension (6/8/2018), Gout, Gout, Gout, History of colon polyps (1/11/2019), Mixed hyperlipidemia (6/8/2018), Secondhand smoke exposure (6/8/2018), Tinnitus, and Upper respiratory infection.  He does not have any pertinent problems on file.  He  has a past surgical history that includes Colonoscopy (12/21/2011); Injection of Medication (12/23/2013); Injection of Medication (08/30/2013); and Injection of Medication (05/15/2011).  His family history includes Stroke in his father and mother.  He  reports that he quit smoking about 40 years ago. His smoking use included cigarettes. He has a 10.50 pack-year smoking history. He has never used smokeless tobacco. He reports current alcohol use of about 2.0 standard drinks of alcohol per week. He reports that he does not use drugs.  Current Outpatient Medications   Medication Sig Dispense Refill   • albuterol sulfate  (90 Base) MCG/ACT inhaler Inhale 2 puffs Every 4 (Four) Hours As Needed for Wheezing. 1 inhaler 11   • allopurinol (ZYLOPRIM) 100 MG tablet Take 1 tablet by mouth Daily. 90 tablet 3   • aspirin 81 MG EC tablet Take 81 mg by mouth Daily.     • atorvastatin (LIPITOR) 10 MG tablet TAKE 1 TABLET BY MOUTH ONCE DAILY FOR CHOLESTEROL 90 tablet 3   • fluticasone (FLONASE) 50 MCG/ACT nasal spray USE 1 SPRAY(S) IN EACH NOSTRIL TWICE DAILY AS  DIRECTED 16 g 5   • losartan (COZAAR) 50 MG tablet Take 1 tablet by mouth twice daily 180 tablet 3   • ondansetron (ZOFRAN) 4 MG tablet Take 1 tablet by mouth Every 8 (Eight) Hours As Needed for Nausea or Vomiting. 20 tablet 0     No current facility-administered medications for this visit.     Current Outpatient Medications on File Prior to Visit   Medication Sig   • albuterol sulfate  (90 Base) MCG/ACT inhaler Inhale 2 puffs Every 4 (Four) Hours As Needed for Wheezing.   • allopurinol (ZYLOPRIM) 100 MG tablet Take 1 tablet by mouth Daily.   • aspirin 81 MG EC tablet Take 81 mg by mouth Daily.   • atorvastatin (LIPITOR) 10 MG tablet TAKE 1 TABLET BY MOUTH ONCE DAILY FOR CHOLESTEROL   • fluticasone (FLONASE) 50 MCG/ACT nasal spray USE 1 SPRAY(S) IN EACH NOSTRIL TWICE DAILY AS DIRECTED   • losartan (COZAAR) 50 MG tablet Take 1 tablet by mouth twice daily   • ondansetron (ZOFRAN) 4 MG tablet Take 1 tablet by mouth Every 8 (Eight) Hours As Needed for Nausea or Vomiting.     No current facility-administered medications on file prior to visit.     He has No Known Allergies..    Compared to one year ago, the patient feels his physical   health is the same.    Compared to one year ago, the patient feels his mental   health is better.    Recent Hospitalizations:  He was not admitted to the hospital during the last year.       Current Medical Providers:  Patient Care Team:  Abel Ríos MD as PCP - General (Internal Medicine)    Outpatient Medications Prior to Visit   Medication Sig Dispense Refill   • albuterol sulfate  (90 Base) MCG/ACT inhaler Inhale 2 puffs Every 4 (Four) Hours As Needed for Wheezing. 1 inhaler 11   • allopurinol (ZYLOPRIM) 100 MG tablet Take 1 tablet by mouth Daily. 90 tablet 3   • aspirin 81 MG EC tablet Take 81 mg by mouth Daily.     • atorvastatin (LIPITOR) 10 MG tablet TAKE 1 TABLET BY MOUTH ONCE DAILY FOR CHOLESTEROL 90 tablet 3   • fluticasone (FLONASE) 50 MCG/ACT nasal spray USE 1  "SPRAY(S) IN EACH NOSTRIL TWICE DAILY AS DIRECTED 16 g 5   • losartan (COZAAR) 50 MG tablet Take 1 tablet by mouth twice daily 180 tablet 3   • ondansetron (ZOFRAN) 4 MG tablet Take 1 tablet by mouth Every 8 (Eight) Hours As Needed for Nausea or Vomiting. 20 tablet 0     No facility-administered medications prior to visit.       No opioid medication identified on active medication list. I have reviewed chart for other potential  high risk medication/s and harmful drug interactions in the elderly.          Aspirin is on active medication list. Aspirin use is indicated based on review of current medical condition/s. Pros and cons of this therapy have been discussed today. Benefits of this medication outweigh potential harm.  Patient has been encouraged to continue taking this medication.  .      Patient Active Problem List   Diagnosis   • Gout   • Impaired fasting glucose   • Mixed hyperlipidemia   • Secondhand smoke exposure   • Essential hypertension   • History of colon polyps   • Sensorineural hearing loss (SNHL) of both ears     Advance Care Planning  Advance Directive is not on file.  ACP discussion was held with the patient during this visit. Patient does not have an advance directive, information provided.          Objective    Vitals:    22 0753   Weight: 80.7 kg (178 lb)   Height: 172.7 cm (68\")     BMI Readings from Last 1 Encounters:   22 27.06 kg/m²   BMI is above normal parameters. Recommendations include: exercise counseling and nutrition counseling    No blood pressure obtained today with this telephone visit due to coronavirus pandemic.  Does the patient have evidence of cognitive impairment? No    Physical Exam            HEALTH RISK ASSESSMENT    Smoking Status:  Social History     Tobacco Use   Smoking Status Former Smoker   • Packs/day: 1.50   • Years: 7.00   • Pack years: 10.50   • Types: Cigarettes   • Quit date:    • Years since quittin.0   Smokeless Tobacco Never Used "     Alcohol Consumption:  Social History     Substance and Sexual Activity   Alcohol Use Yes   • Alcohol/week: 2.0 standard drinks   • Types: 2 Cans of beer per week     Fall Risk Screen:    PEBBLES Fall Risk Assessment was completed, and patient is at LOW risk for falls.Assessment completed on:1/17/2022    Depression Screening:  PHQ-2/PHQ-9 Depression Screening 8/26/2021   Little interest or pleasure in doing things 0   Feeling down, depressed, or hopeless 0   Total Score 0       Health Habits and Functional and Cognitive Screening:  Functional & Cognitive Status 1/17/2022   Do you have difficulty preparing food and eating? No   Do you have difficulty bathing yourself, getting dressed or grooming yourself? No   Do you have difficulty using the toilet? No   Do you have difficulty moving around from place to place? No   Do you have trouble with steps or getting out of a bed or a chair? No   Current Diet Well Balanced Diet   Dental Exam Up to date   Eye Exam Up to date   Exercise (times per week) 2 times per week   Current Exercises Include Walking   Current Exercise Activities Include -   Do you need help using the phone?  No   Are you deaf or do you have serious difficulty hearing?  -   Do you need help with transportation? No   Do you need help shopping? -   Do you need help preparing meals?  No   Do you need help with housework?  No   Do you need help with laundry? No   Do you need help taking your medications? No   Do you need help managing money? No   Do you ever drive or ride in a car without wearing a seat belt? No   Have you felt unusual stress, anger or loneliness in the last month? No   Who do you live with? Spouse   If you need help, do you have trouble finding someone available to you? No   Have you been bothered in the last four weeks by sexual problems? -   Do you have difficulty concentrating, remembering or making decisions? No       Age-appropriate Screening Schedule:  Refer to the list below for  future screening recommendations based on patient's age, sex and/or medical conditions. Orders for these recommended tests are listed in the plan section. The patient has been provided with a written plan.    Health Maintenance   Topic Date Due   • ZOSTER VACCINE (2 of 2) 12/21/2016   • LIPID PANEL  08/18/2022   • TDAP/TD VACCINES (3 - Td or Tdap) 10/26/2025   • INFLUENZA VACCINE  Completed              Assessment/Plan   CMS Preventative Services Quick Reference  Risk Factors Identified During Encounter  Immunizations Discussed/Encouraged (specific Immunizations; Shingrix and COVID19  The above risks/problems have been discussed with the patient.  He is encouraged to get his COVID booster ASAP, and is in agreement to do so.  We discussed possibly getting the Shingrix vaccine later this year.  Follow up actions/plans if indicated are seen below in the Assessment/Plan Section.  Pertinent information has been shared with the patient in the After Visit Summary.    Diagnoses and all orders for this visit:    1. Medicare annual wellness visit, subsequent (Primary)    2. Need for COVID-19 vaccine        Follow Up:   Return in about 1 year (around 1/17/2023) for Medicare Wellness.     An After Visit Summary and PPPS were made available to the patient.

## 2022-01-17 NOTE — PATIENT INSTRUCTIONS
Medicare Wellness  Personal Prevention Plan of Service     Date of Office Visit:  2022  Encounter Provider:  Abel Ríos MD  Place of Service:  [facility]  Patient Name: Don Oscar  :  1955    As part of the Medicare Wellness portion of your visit today, we are providing you with this personalized preventive plan of services (PPPS). This plan is based upon recommendations of the [place] Preventive Services Task Force (USPSTF) and the Advisory Committee on Immunization Practices (ACIP).    This lists the preventive care services that should be considered, and provides dates of when you are due. Items listed as completed are up-to-date and do not require any further intervention.    Health Maintenance   Topic Date Due   • ZOSTER VACCINE (2 of 2) 2016   • COVID-19 Vaccine (3 - Booster for Moderna series) 10/06/2021   • ANNUAL WELLNESS VISIT  10/28/2021   • LIPID PANEL  2022   • COLORECTAL CANCER SCREENING  2023   • TDAP/TD VACCINES (3 - Td or Tdap) 10/26/2025   • HEPATITIS C SCREENING  Completed   • INFLUENZA VACCINE  Completed   • Pneumococcal Vaccine 65+  Completed   • AAA SCREEN (ONE-TIME)  Completed       No orders of the defined types were placed in this encounter.      Return in about 1 year (around 2023) for Medicare Wellness.          Exercising to Lose Weight  Exercise is structured, repetitive physical activity to improve fitness and health. Getting regular exercise is important for everyone. It is especially important if you are overweight. Being overweight increases your risk of heart disease, stroke, diabetes, high blood pressure, and several types of cancer. Reducing your calorie intake and exercising can help you lose weight.  Exercise is usually categorized as moderate or vigorous intensity. To lose weight, most people need to do a certain amount of moderate-intensity or vigorous-intensity exercise  each week.  Moderate-intensity exercise    Moderate-intensity exercise is any activity that gets you moving enough to burn at least three times more energy (calories) than if you were sitting.  Examples of moderate exercise include:  · Walking a mile in 15 minutes.  · Doing light yard work.  · Biking at an easy pace.  Most people should get at least 150 minutes (2 hours and 30 minutes) a week of moderate-intensity exercise to maintain their body weight.  Vigorous-intensity exercise  Vigorous-intensity exercise is any activity that gets you moving enough to burn at least six times more calories than if you were sitting. When you exercise at this intensity, you should be working hard enough that you are not able to carry on a conversation.  Examples of vigorous exercise include:  · Running.  · Playing a team sport, such as football, basketball, and soccer.  · Jumping rope.  Most people should get at least 75 minutes (1 hour and 15 minutes) a week of vigorous-intensity exercise to maintain their body weight.  How can exercise affect me?  When you exercise enough to burn more calories than you eat, you lose weight. Exercise also reduces body fat and builds muscle. The more muscle you have, the more calories you burn. Exercise also:  · Improves mood.  · Reduces stress and tension.  · Improves your overall fitness, flexibility, and endurance.  · Increases bone strength.  The amount of exercise you need to lose weight depends on:  · Your age.  · The type of exercise.  · Any health conditions you have.  · Your overall physical ability.  Talk to your health care provider about how much exercise you need and what types of activities are safe for you.  What actions can I take to lose weight?  Nutrition    · Make changes to your diet as told by your health care provider or diet and nutrition specialist (dietitian). This may include:  ? Eating fewer calories.  ? Eating more protein.  ? Eating less unhealthy fats.  ? Eating a  diet that includes fresh fruits and vegetables, whole grains, low-fat dairy products, and lean protein.  ? Avoiding foods with added fat, salt, and sugar.  · Drink plenty of water while you exercise to prevent dehydration or heat stroke.    Activity  · Choose an activity that you enjoy and set realistic goals. Your health care provider can help you make an exercise plan that works for you.  · Exercise at a moderate or vigorous intensity most days of the week.  ? The intensity of exercise may vary from person to person. You can tell how intense a workout is for you by paying attention to your breathing and heartbeat. Most people will notice their breathing and heartbeat get faster with more intense exercise.  · Do resistance training twice each week, such as:  ? Push-ups.  ? Sit-ups.  ? Lifting weights.  ? Using resistance bands.  · Getting short amounts of exercise can be just as helpful as long structured periods of exercise. If you have trouble finding time to exercise, try to include exercise in your daily routine.  ? Get up, stretch, and walk around every 30 minutes throughout the day.  ? Go for a walk during your lunch break.  ? Park your car farther away from your destination.  ? If you take public transportation, get off one stop early and walk the rest of the way.  ? Make phone calls while standing up and walking around.  ? Take the stairs instead of elevators or escalators.  · Wear comfortable clothes and shoes with good support.  · Do not exercise so much that you hurt yourself, feel dizzy, or get very short of breath.  Where to find more information  · [place] Department of Health and Human Services: www.hhs.gov  · Centers for Disease Control and Prevention (CDC): www.cdc.gov  Contact a health care provider:  · Before starting a new exercise program.  · If you have questions or concerns about your weight.  · If you have a medical problem that keeps you from exercising.  Get help right away if you have any of  the following while exercising:  · Injury.  · Dizziness.  · Difficulty breathing or shortness of breath that does not go away when you stop exercising.  · Chest pain.  · Rapid heartbeat.  Summary  · Being overweight increases your risk of heart disease, stroke, diabetes, high blood pressure, and several types of cancer.  · Losing weight happens when you burn more calories than you eat.  · Reducing the amount of calories you eat in addition to getting regular moderate or vigorous exercise each week helps you lose weight.  This information is not intended to replace advice given to you by your health care provider. Make sure you discuss any questions you have with your health care provider.  Document Revised: 04/15/2021 Document Reviewed: 04/15/2021  [employer] Patient Education © 2021 [employer]      Calorie Counting for Weight Loss  Calories are units of energy. Your body needs a certain number of calories from food to keep going throughout the day. When you eat or drink more calories than your body needs, your body stores the extra calories mostly as fat. When you eat or drink fewer calories than your body needs, your body burns fat to get the energy it needs.  Calorie counting means keeping track of how many calories you eat and drink each day. Calorie counting can be helpful if you need to lose weight. If you eat fewer calories than your body needs, you should lose weight. Ask your health care provider what a healthy weight is for you.  For calorie counting to work, you will need to eat the right number of calories each day to lose a healthy amount of weight per week. A dietitian can help you figure out how many calories you need in a day and will suggest ways to reach your calorie goal.  · A healthy amount of weight to lose each week is usually 1-2 lb (0.5-0.9 kg). This usually means that your daily calorie intake should be reduced by 500-750 calories.  · Eating 1,200-1,500 calories a day can help most women  lose weight.  · Eating 1,500-1,800 calories a day can help most men lose weight.  What do I need to know about calorie counting?  Work with your health care provider or dietitian to determine how many calories you should get each day. To meet your daily calorie goal, you will need to:  · Find out how many calories are in each food that you would like to eat. Try to do this before you eat.  · Decide how much of the food you plan to eat.  · Keep a food log. Do this by writing down what you ate and how many calories it had.  To successfully lose weight, it is important to balance calorie counting with a healthy lifestyle that includes regular activity.  Where do I find calorie information?    The number of calories in a food can be found on a Nutrition Facts label. If a food does not have a Nutrition Facts label, try to look up the calories online or ask your dietitian for help.  Remember that calories are listed per serving. If you choose to have more than one serving of a food, you will have to multiply the calories per serving by the number of servings you plan to eat. For example, the label on a package of bread might say that a serving size is 1 slice and that there are 90 calories in a serving. If you eat 1 slice, you will have eaten 90 calories. If you eat 2 slices, you will have eaten 180 calories.  How do I keep a food log?  After each time that you eat, record the following in your food log as soon as possible:  · What you ate. Be sure to include toppings, sauces, and other extras on the food.  · How much you ate. This can be measured in cups, ounces, or number of items.  · How many calories were in each food and drink.  · The total number of calories in the food you ate.  Keep your food log near you, such as in a pocket-sized notebook or on an bhavana or website on your mobile phone. Some programs will calculate calories for you and show you how many calories you have left to meet your daily goal.  What are some  portion-control tips?  · Know how many calories are in a serving. This will help you know how many servings you can have of a certain food.  · Use a measuring cup to measure serving sizes. You could also try weighing out portions on a kitchen scale. With time, you will be able to estimate serving sizes for some foods.  · Take time to put servings of different foods on your favorite plates or in your favorite bowls and cups so you know what a serving looks like.  · Try not to eat straight from a food's packaging, such as from a bag or box. Eating straight from the package makes it hard to see how much you are eating and can lead to overeating. Put the amount you would like to eat in a cup or on a plate to make sure you are eating the right portion.  · Use smaller plates, glasses, and bowls for smaller portions and to prevent overeating.  · Try not to multitask. For example, avoid watching TV or using your computer while eating. If it is time to eat, sit down at a table and enjoy your food. This will help you recognize when you are full. It will also help you be more mindful of what and how much you are eating.  What are tips for following this plan?  Reading food labels  · Check the calorie count compared with the serving size. The serving size may be smaller than what you are used to eating.  · Check the source of the calories. Try to choose foods that are high in protein, fiber, and vitamins, and low in saturated fat, trans fat, and sodium.  Shopping  · Read nutrition labels while you shop. This will help you make healthy decisions about which foods to buy.  · Pay attention to nutrition labels for low-fat or fat-free foods. These foods sometimes have the same number of calories or more calories than the full-fat versions. They also often have added sugar, starch, or salt to make up for flavor that was removed with the fat.  · Make a grocery list of lower-calorie foods and stick to it.  Cooking  · Try to cook your  favorite foods in a healthier way. For example, try baking instead of frying.  · Use low-fat dairy products.  Meal planning  · Use more fruits and vegetables. One-half of your plate should be fruits and vegetables.  · Include lean proteins, such as chicken, turkey, and fish.  Lifestyle  Each week, aim to do one of the following:  · 150 minutes of moderate exercise, such as walking.  · 75 minutes of vigorous exercise, such as running.  General information  · Know how many calories are in the foods you eat most often. This will help you calculate calorie counts faster.  · Find a way of tracking calories that works for you. Get creative. Try different apps or programs if writing down calories does not work for you.  What foods should I eat?    · Eat nutritious foods. It is better to have a nutritious, high-calorie food, such as an avocado, than a food with few nutrients, such as a bag of potato chips.  · Use your calories on foods and drinks that will fill you up and will not leave you hungry soon after eating.  ? Examples of foods that fill you up are nuts and nut butters, vegetables, lean proteins, and high-fiber foods such as whole grains. High-fiber foods are foods with more than 5 g of fiber per serving.  · Pay attention to calories in drinks. Low-calorie drinks include water and unsweetened drinks.  The items listed above may not be a complete list of foods and beverages you can eat. Contact a dietitian for more information.  What foods should I limit?  Limit foods or drinks that are not good sources of vitamins, minerals, or protein or that are high in unhealthy fats. These include:  · Candy.  · Other sweets.  · Sodas, specialty coffee drinks, alcohol, and juice.  The items listed above may not be a complete list of foods and beverages you should avoid. Contact a dietitian for more information.  How do I count calories when eating out?  · Pay attention to portions. Often, portions are much larger when eating  out. Try these tips to keep portions smaller:  ? Consider sharing a meal instead of getting your own.  ? If you get your own meal, eat only half of it. Before you start eating, ask for a container and put half of your meal into it.  ? When available, consider ordering smaller portions from the menu instead of full portions.  · Pay attention to your food and drink choices. Knowing the way food is cooked and what is included with the meal can help you eat fewer calories.  ? If calories are listed on the menu, choose the lower-calorie options.  ? Choose dishes that include vegetables, fruits, whole grains, low-fat dairy products, and lean proteins.  ? Choose items that are boiled, broiled, grilled, or steamed. Avoid items that are buttered, battered, fried, or served with cream sauce. Items labeled as crispy are usually fried, unless stated otherwise.  ? Choose water, low-fat milk, unsweetened iced tea, or other drinks without added sugar. If you want an alcoholic beverage, choose a lower-calorie option, such as a glass of wine or light beer.  ? Ask for dressings, sauces, and syrups on the side. These are usually high in calories, so you should limit the amount you eat.  ? If you want a salad, choose a garden salad and ask for grilled meats. Avoid extra toppings such as kat, cheese, or fried items. Ask for the dressing on the side, or ask for olive oil and vinegar or lemon to use as dressing.  · Estimate how many servings of a food you are given. Knowing serving sizes will help you be aware of how much food you are eating at restaurants.  Where to find more information  · Centers for Disease Control and Prevention: www.cdc.gov  · [place] Department of Agriculture: myplate.gov  Summary  · Calorie counting means keeping track of how many calories you eat and drink each day. If you eat fewer calories than your body needs, you should lose weight.  · A healthy amount of weight to lose per week is usually 1-2 lb (0.5-0.9 kg).  This usually means reducing your daily calorie intake by 500-750 calories.  · The number of calories in a food can be found on a Nutrition Facts label. If a food does not have a Nutrition Facts label, try to look up the calories online or ask your dietitian for help.  · Use smaller plates, glasses, and bowls for smaller portions and to prevent overeating.  · Use your calories on foods and drinks that will fill you up and not leave you hungry shortly after a meal.  This information is not intended to replace advice given to you by your health care provider. Make sure you discuss any questions you have with your health care provider.  Document Revised: 01/28/2021 Document Reviewed: 01/28/2021  [employer] Patient Education © 2021 [employer]

## 2022-01-28 RX ORDER — ATORVASTATIN CALCIUM 10 MG/1
10 TABLET, FILM COATED ORAL EVERY OTHER DAY
Qty: 45 TABLET | Refills: 3 | Status: SHIPPED | OUTPATIENT
Start: 2022-01-28 | End: 2023-01-20

## 2022-03-03 ENCOUNTER — LAB (OUTPATIENT)
Dept: LAB | Facility: OTHER | Age: 67
End: 2022-03-03

## 2022-03-03 DIAGNOSIS — M10.071 ACUTE IDIOPATHIC GOUT OF RIGHT ANKLE: Chronic | ICD-10-CM

## 2022-03-03 DIAGNOSIS — R73.01 IMPAIRED FASTING GLUCOSE: Chronic | ICD-10-CM

## 2022-03-03 DIAGNOSIS — I10 ESSENTIAL HYPERTENSION: Chronic | ICD-10-CM

## 2022-03-03 DIAGNOSIS — E78.2 MIXED HYPERLIPIDEMIA: Chronic | ICD-10-CM

## 2022-03-03 LAB
ALBUMIN SERPL-MCNC: 4 G/DL (ref 3.5–5)
ALBUMIN/GLOB SERPL: 1.5 G/DL (ref 1.1–1.8)
ALP SERPL-CCNC: 74 U/L (ref 38–126)
ALT SERPL W P-5'-P-CCNC: 31 U/L
ANION GAP SERPL CALCULATED.3IONS-SCNC: 7 MMOL/L (ref 5–15)
ARTICHOKE IGE QN: 101 MG/DL (ref 0–100)
AST SERPL-CCNC: 24 U/L (ref 17–59)
BASOPHILS # BLD AUTO: 0.03 10*3/MM3 (ref 0–0.2)
BASOPHILS NFR BLD AUTO: 0.5 % (ref 0–1.5)
BILIRUB SERPL-MCNC: 0.6 MG/DL (ref 0.2–1.3)
BUN SERPL-MCNC: 21 MG/DL (ref 7–23)
BUN/CREAT SERPL: 20.2 (ref 7–25)
CALCIUM SPEC-SCNC: 9 MG/DL (ref 8.4–10.2)
CHLORIDE SERPL-SCNC: 104 MMOL/L (ref 101–112)
CO2 SERPL-SCNC: 26 MMOL/L (ref 22–30)
CREAT SERPL-MCNC: 1.04 MG/DL (ref 0.7–1.3)
DEPRECATED RDW RBC AUTO: 40.5 FL (ref 37–54)
EGFRCR SERPLBLD CKD-EPI 2021: 78.7 ML/MIN/1.73
EOSINOPHIL # BLD AUTO: 0.15 10*3/MM3 (ref 0–0.4)
EOSINOPHIL NFR BLD AUTO: 2.4 % (ref 0.3–6.2)
ERYTHROCYTE [DISTWIDTH] IN BLOOD BY AUTOMATED COUNT: 12.6 % (ref 12.3–15.4)
GLOBULIN UR ELPH-MCNC: 2.7 GM/DL (ref 2.3–3.5)
GLUCOSE SERPL-MCNC: 118 MG/DL (ref 70–99)
HBA1C MFR BLD: 5.8 % (ref 4.8–5.6)
HCT VFR BLD AUTO: 41.3 % (ref 37.5–51)
HGB BLD-MCNC: 13.8 G/DL (ref 13–17.7)
LYMPHOCYTES # BLD AUTO: 1.76 10*3/MM3 (ref 0.7–3.1)
LYMPHOCYTES NFR BLD AUTO: 27.8 % (ref 19.6–45.3)
MCH RBC QN AUTO: 30.2 PG (ref 26.6–33)
MCHC RBC AUTO-ENTMCNC: 33.4 G/DL (ref 31.5–35.7)
MCV RBC AUTO: 90.4 FL (ref 79–97)
MONOCYTES # BLD AUTO: 0.55 10*3/MM3 (ref 0.1–0.9)
MONOCYTES NFR BLD AUTO: 8.7 % (ref 5–12)
NEUTROPHILS NFR BLD AUTO: 3.84 10*3/MM3 (ref 1.7–7)
NEUTROPHILS NFR BLD AUTO: 60.6 % (ref 42.7–76)
PLATELET # BLD AUTO: 283 10*3/MM3 (ref 140–450)
PMV BLD AUTO: 8.6 FL (ref 6–12)
POTASSIUM SERPL-SCNC: 4.3 MMOL/L (ref 3.4–5)
PROT SERPL-MCNC: 6.7 G/DL (ref 6.3–8.6)
RBC # BLD AUTO: 4.57 10*6/MM3 (ref 4.14–5.8)
SODIUM SERPL-SCNC: 137 MMOL/L (ref 137–145)
URATE SERPL-MCNC: 7.4 MG/DL (ref 3.5–8.5)
WBC NRBC COR # BLD: 6.33 10*3/MM3 (ref 3.4–10.8)

## 2022-03-03 PROCEDURE — 85025 COMPLETE CBC W/AUTO DIFF WBC: CPT | Performed by: INTERNAL MEDICINE

## 2022-03-03 PROCEDURE — 80053 COMPREHEN METABOLIC PANEL: CPT | Performed by: INTERNAL MEDICINE

## 2022-03-03 PROCEDURE — 84550 ASSAY OF BLOOD/URIC ACID: CPT | Performed by: INTERNAL MEDICINE

## 2022-03-03 PROCEDURE — 36415 COLL VENOUS BLD VENIPUNCTURE: CPT | Performed by: INTERNAL MEDICINE

## 2022-03-03 PROCEDURE — 83721 ASSAY OF BLOOD LIPOPROTEIN: CPT | Performed by: INTERNAL MEDICINE

## 2022-03-03 PROCEDURE — 83036 HEMOGLOBIN GLYCOSYLATED A1C: CPT | Performed by: INTERNAL MEDICINE

## 2022-03-09 ENCOUNTER — OFFICE VISIT (OUTPATIENT)
Dept: FAMILY MEDICINE CLINIC | Facility: CLINIC | Age: 67
End: 2022-03-09

## 2022-03-09 VITALS
HEART RATE: 66 BPM | WEIGHT: 178 LBS | DIASTOLIC BLOOD PRESSURE: 78 MMHG | BODY MASS INDEX: 26.98 KG/M2 | HEIGHT: 68 IN | TEMPERATURE: 97.4 F | OXYGEN SATURATION: 97 % | SYSTOLIC BLOOD PRESSURE: 136 MMHG

## 2022-03-09 DIAGNOSIS — I10 ESSENTIAL HYPERTENSION: Chronic | ICD-10-CM

## 2022-03-09 DIAGNOSIS — H90.3 SENSORINEURAL HEARING LOSS (SNHL) OF BOTH EARS: Chronic | ICD-10-CM

## 2022-03-09 DIAGNOSIS — E78.2 MIXED HYPERLIPIDEMIA: Primary | Chronic | ICD-10-CM

## 2022-03-09 DIAGNOSIS — M1A.9XX0 CHRONIC GOUT WITHOUT TOPHUS, UNSPECIFIED CAUSE, UNSPECIFIED SITE: ICD-10-CM

## 2022-03-09 DIAGNOSIS — M10.071 ACUTE IDIOPATHIC GOUT OF RIGHT ANKLE: Chronic | ICD-10-CM

## 2022-03-09 DIAGNOSIS — R73.01 IMPAIRED FASTING GLUCOSE: Chronic | ICD-10-CM

## 2022-03-09 DIAGNOSIS — E66.3 OVERWEIGHT (BMI 25.0-29.9): Chronic | ICD-10-CM

## 2022-03-09 DIAGNOSIS — Z12.5 SPECIAL SCREENING FOR MALIGNANT NEOPLASM OF PROSTATE: ICD-10-CM

## 2022-03-09 DIAGNOSIS — M62.838 MUSCLE SPASMS OF NECK: Chronic | ICD-10-CM

## 2022-03-09 PROCEDURE — 99214 OFFICE O/P EST MOD 30 MIN: CPT | Performed by: INTERNAL MEDICINE

## 2022-03-09 RX ORDER — ONDANSETRON 4 MG/1
4 TABLET, ORALLY DISINTEGRATING ORAL EVERY 8 HOURS PRN
COMMUNITY
Start: 2022-02-11 | End: 2022-03-09 | Stop reason: SDUPTHER

## 2022-03-09 NOTE — PROGRESS NOTES
"Chief Complaint  Follow-up (6 month)    Subjective          History of Present Illness     Don Oscar presents to the office for 6-month follow up on hyperlipidemia, hypertension, gout and impaired fasting glucose, among other issues. He continues on prophylactic allopurinol for gout. He had a mild gout flare right foot/ankle about 10 days ago, for which he got relief with Aleve.   He is quite hard of hearing due to sensorineural hearing loss, requiring hearing aids.  He can understand me as long as I am speaking in front of him.     Weight is stable at 178 pounds. He has joined a gym where he works out twice weekly as well as getting other exercise including [email or web address].  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  He has a personal weight loss goal of 10-15 pounds.   Labs reveal persistent impaired fasting glucose, which has not progressed with his regular exercise.    Patient has episodes of left-sided posterior cervical muscle spasm, for which I demonstrated deep muscle massage to provide temporary relief of the muscle spasm.             He is up to date on cancer screening and immunizations with the exception of Shingrix, which is available to patient.  He has been fully vaccinated for COVID including Moderna booster 01/24/2022.      The patient's relevant past medical, surgical, and social history was reviewed in Epic.   Lab results are reviewed with the patient today.  CBC unremarkable.  Fasting glucose 118.  A1c 5.8.  LDL slightly above goal at 101 with Lipitor   Normal liver and renal function.       Objective   Vital Signs:   /78   Pulse 66   Temp 97.4 °F (36.3 °C) (Tympanic)   Ht 172.7 cm (68\")   Wt 80.7 kg (178 lb)   SpO2 97%   BMI 27.06 kg/m²         Physical Exam  Vitals reviewed.   Constitutional:       General: He is not in acute distress.     Appearance: He is well-developed.      Comments: Pleasant male, overweight.    HENT:      Head: Normocephalic " and atraumatic.      Ears:      Comments: Hard of hearing     Nose:      Right Sinus: No maxillary sinus tenderness or frontal sinus tenderness.      Left Sinus: No maxillary sinus tenderness or frontal sinus tenderness.      Mouth/Throat:      Mouth: No oral lesions.      Pharynx: Uvula midline.      Tonsils: No tonsillar exudate.   Eyes:      Conjunctiva/sclera: Conjunctivae normal.      Pupils: Pupils are equal, round, and reactive to light.   Neck:      Thyroid: No thyroid mass or thyromegaly.      Vascular: No carotid bruit or JVD.      Trachea: Trachea normal. No tracheal deviation.   Cardiovascular:      Rate and Rhythm: Normal rate and regular rhythm.  No extrasystoles are present.     Chest Wall: PMI is not displaced.      Heart sounds: Normal heart sounds. No murmur heard.  Pulmonary:      Effort: Pulmonary effort is normal. No accessory muscle usage or respiratory distress.      Breath sounds: Normal breath sounds. No decreased breath sounds, wheezing, rhonchi or rales.      Comments: A few chronic lung sounds, otherwise normal   Abdominal:      General: Bowel sounds are normal. There is no distension.      Palpations: Abdomen is soft.      Tenderness: There is no abdominal tenderness.   Musculoskeletal:      Cervical back: Neck supple.      Comments: Posterior cervical muscle spasm   Lymphadenopathy:      Cervical: No cervical adenopathy.   Skin:     General: Skin is warm and dry.      Findings: No rash.      Nails: There is no clubbing.   Neurological:      Mental Status: He is alert and oriented to person, place, and time.      Cranial Nerves: No cranial nerve deficit.      Coordination: Coordination normal.   Psychiatric:         Speech: Speech normal.         Behavior: Behavior normal.         Thought Content: Thought content normal.         Judgment: Judgment normal.            Result Review :     CMP    CMP 8/18/21 3/3/22   Glucose 115 (A) 118 (A)   BUN 18 21   Creatinine 1.11 1.04   eGFR Non  African Am 66    Sodium 138 137   Potassium 4.5 4.3   Chloride 108 104   Calcium 9.2 9.0   Albumin 4.20 4.00   Total Bilirubin 0.5 0.6   Alkaline Phosphatase 74 74   AST (SGOT) 27 24   ALT (SGPT) 33 31   (A) Abnormal value            CBC w/diff    CBC w/Diff 8/18/21 3/3/22   WBC 6.56 6.33   RBC 4.68 4.57   Hemoglobin 14.9 13.8   Hematocrit 43.0 41.3   MCV 91.9 90.4   MCH 31.8 30.2   MCHC 34.7 33.4   RDW 12.9 12.6   Platelets 255 283   Neutrophil Rel % 67.1 60.6   Lymphocyte Rel % 19.4 (A) 27.8   Monocyte Rel % 10.1 8.7   Eosinophil Rel % 2.9 2.4   Basophil Rel % 0.5 0.5   (A) Abnormal value            Lipid Panel    Lipid Panel 8/18/21 3/3/22   Total Cholesterol 153    Triglycerides 61    HDL Cholesterol 38 (A)    VLDL Cholesterol 12    LDL Cholesterol  103 (A) 101 (A)   LDL/HDL Ratio 2.71    (A) Abnormal value            TSH    TSH 8/18/21   TSH 1.580           A1C Last 3 Results    HGBA1C Last 3 Results 8/18/21 3/3/22   Hemoglobin A1C 5.57 5.80 (A)   (A) Abnormal value                      Assessment and Plan    Diagnoses and all orders for this visit:    1. Mixed hyperlipidemia (Primary)  -     Comprehensive Metabolic Panel; Future  -     Lipid Panel; Future  -     TSH; Future    2. Essential hypertension  -     CBC Auto Differential; Future  -     Comprehensive Metabolic Panel; Future    3. Impaired fasting glucose  -     Hemoglobin A1c; Future    4. Overweight (BMI 25.0-29.9)  -     Comprehensive Metabolic Panel; Future    5. Acute idiopathic gout of right ankle  -     Uric Acid; Future    6. Sensorineural hearing loss (SNHL) of both ears    7. Chronic gout without tophus, unspecified cause, unspecified site  -     Uric Acid; Future    8. Special screening for malignant neoplasm of prostate  -     PSA Screen; Future         I spent 31 minutes caring for Don on this date of service. This time includes time spent by me in the following activities:preparing for the visit, reviewing tests, performing a  medically appropriate examination and/or evaluation , counseling and educating the patient/family/caregiver, ordering medications, tests, or procedures and documenting information in the medical record     Patient declines Shingrix today, but will consider having it in the future.  He is up to date on other health maintenance issues.     Encouraged to continue regular exercise and dietary efforts to achieve gradual 10-15 pound weight loss, which will delay progression of impaired fasting glucose and help lower cholesterol and blood pressure.  He can consider adding exercise on the two days weekly he is not working out in the gym.        I demonstrated deep muscle massage to provide temporary relief of the posterior muscle spasm.         Continue prophylactic allopurinol for gout, which is stable/well-controlled.  He gets good relief of rare, mild gout flares with Aleve.  Uric acid 7.4.        Continue Lipitor for hyperlipidemia and Cozaar for hypertension . Pursue sodium restriction and weight loss.  Continue regular exercise.    His hearing loss seems to be stable and still managed with hearing aids.  He can understand me adequately as long as I am standing in front of him.  Wearing the mask makes it more difficult       Return to clinic in 6 months with fasting labs prior, sooner if needed.    Scribed for Dr. Ríos by Ceci Hensley [facility].     Follow Up   Return in about 6 months (around 9/9/2022) for Follow up in six months with labs one week prior., Next scheduled follow up - labs 1 week prior.  Patient was given instructions and counseling regarding his condition or for health maintenance advice. Please see specific information pulled into the AVS if appropriate.

## 2022-09-08 ENCOUNTER — LAB (OUTPATIENT)
Dept: LAB | Facility: OTHER | Age: 67
End: 2022-09-08

## 2022-09-08 DIAGNOSIS — E66.3 OVERWEIGHT (BMI 25.0-29.9): Chronic | ICD-10-CM

## 2022-09-08 DIAGNOSIS — E78.2 MIXED HYPERLIPIDEMIA: Chronic | ICD-10-CM

## 2022-09-08 DIAGNOSIS — M1A.9XX0 CHRONIC GOUT WITHOUT TOPHUS, UNSPECIFIED CAUSE, UNSPECIFIED SITE: ICD-10-CM

## 2022-09-08 DIAGNOSIS — Z12.5 SPECIAL SCREENING FOR MALIGNANT NEOPLASM OF PROSTATE: ICD-10-CM

## 2022-09-08 DIAGNOSIS — M10.071 ACUTE IDIOPATHIC GOUT OF RIGHT ANKLE: Chronic | ICD-10-CM

## 2022-09-08 DIAGNOSIS — I10 ESSENTIAL HYPERTENSION: Chronic | ICD-10-CM

## 2022-09-08 DIAGNOSIS — R73.01 IMPAIRED FASTING GLUCOSE: Chronic | ICD-10-CM

## 2022-09-08 LAB
ALBUMIN SERPL-MCNC: 4 G/DL (ref 3.5–5)
ALBUMIN/GLOB SERPL: 1.6 G/DL (ref 1.1–1.8)
ALP SERPL-CCNC: 81 U/L (ref 38–126)
ALT SERPL W P-5'-P-CCNC: 29 U/L
ANION GAP SERPL CALCULATED.3IONS-SCNC: 3 MMOL/L (ref 5–15)
AST SERPL-CCNC: 23 U/L (ref 17–59)
BASOPHILS # BLD AUTO: 0.03 10*3/MM3 (ref 0–0.2)
BASOPHILS NFR BLD AUTO: 0.4 % (ref 0–1.5)
BILIRUB SERPL-MCNC: 0.5 MG/DL (ref 0.2–1.3)
BUN SERPL-MCNC: 18 MG/DL (ref 7–23)
BUN/CREAT SERPL: 17.8 (ref 7–25)
CALCIUM SPEC-SCNC: 9.2 MG/DL (ref 8.4–10.2)
CHLORIDE SERPL-SCNC: 105 MMOL/L (ref 101–112)
CHOLEST SERPL-MCNC: 141 MG/DL (ref 150–200)
CO2 SERPL-SCNC: 29 MMOL/L (ref 22–30)
CREAT SERPL-MCNC: 1.01 MG/DL (ref 0.7–1.3)
DEPRECATED RDW RBC AUTO: 40.9 FL (ref 37–54)
EGFRCR SERPLBLD CKD-EPI 2021: 81.5 ML/MIN/1.73
EOSINOPHIL # BLD AUTO: 0.2 10*3/MM3 (ref 0–0.4)
EOSINOPHIL NFR BLD AUTO: 2.8 % (ref 0.3–6.2)
ERYTHROCYTE [DISTWIDTH] IN BLOOD BY AUTOMATED COUNT: 12.8 % (ref 12.3–15.4)
GLOBULIN UR ELPH-MCNC: 2.5 GM/DL (ref 2.3–3.5)
GLUCOSE SERPL-MCNC: 116 MG/DL (ref 70–99)
HBA1C MFR BLD: 5.5 % (ref 4.8–5.6)
HCT VFR BLD AUTO: 42.3 % (ref 37.5–51)
HDLC SERPL-MCNC: 37 MG/DL (ref 40–59)
HGB BLD-MCNC: 14.2 G/DL (ref 13–17.7)
LDLC SERPL CALC-MCNC: 89 MG/DL
LDLC/HDLC SERPL: 2.39 {RATIO} (ref 0–3.55)
LYMPHOCYTES # BLD AUTO: 1.7 10*3/MM3 (ref 0.7–3.1)
LYMPHOCYTES NFR BLD AUTO: 23.6 % (ref 19.6–45.3)
MCH RBC QN AUTO: 30.2 PG (ref 26.6–33)
MCHC RBC AUTO-ENTMCNC: 33.6 G/DL (ref 31.5–35.7)
MCV RBC AUTO: 90 FL (ref 79–97)
MONOCYTES # BLD AUTO: 0.59 10*3/MM3 (ref 0.1–0.9)
MONOCYTES NFR BLD AUTO: 8.2 % (ref 5–12)
NEUTROPHILS NFR BLD AUTO: 4.67 10*3/MM3 (ref 1.7–7)
NEUTROPHILS NFR BLD AUTO: 65 % (ref 42.7–76)
PLATELET # BLD AUTO: 265 10*3/MM3 (ref 140–450)
PMV BLD AUTO: 8.8 FL (ref 6–12)
POTASSIUM SERPL-SCNC: 4.4 MMOL/L (ref 3.4–5)
PROT SERPL-MCNC: 6.5 G/DL (ref 6.3–8.6)
PSA SERPL-MCNC: 0.81 NG/ML (ref 0–4)
RBC # BLD AUTO: 4.7 10*6/MM3 (ref 4.14–5.8)
SODIUM SERPL-SCNC: 137 MMOL/L (ref 137–145)
TRIGL SERPL-MCNC: 78 MG/DL
TSH SERPL DL<=0.05 MIU/L-ACNC: 2.18 UIU/ML (ref 0.27–4.2)
URATE SERPL-MCNC: 6.7 MG/DL (ref 3.5–8.5)
VLDLC SERPL-MCNC: 15 MG/DL (ref 5–40)
WBC NRBC COR # BLD: 7.19 10*3/MM3 (ref 3.4–10.8)

## 2022-09-08 PROCEDURE — 85025 COMPLETE CBC W/AUTO DIFF WBC: CPT | Performed by: INTERNAL MEDICINE

## 2022-09-08 PROCEDURE — 80061 LIPID PANEL: CPT | Performed by: INTERNAL MEDICINE

## 2022-09-08 PROCEDURE — 36415 COLL VENOUS BLD VENIPUNCTURE: CPT | Performed by: INTERNAL MEDICINE

## 2022-09-08 PROCEDURE — 84550 ASSAY OF BLOOD/URIC ACID: CPT | Performed by: INTERNAL MEDICINE

## 2022-09-08 PROCEDURE — 83036 HEMOGLOBIN GLYCOSYLATED A1C: CPT | Performed by: INTERNAL MEDICINE

## 2022-09-08 PROCEDURE — 84443 ASSAY THYROID STIM HORMONE: CPT | Performed by: INTERNAL MEDICINE

## 2022-09-08 PROCEDURE — G0103 PSA SCREENING: HCPCS | Performed by: INTERNAL MEDICINE

## 2022-09-08 PROCEDURE — 80053 COMPREHEN METABOLIC PANEL: CPT | Performed by: INTERNAL MEDICINE

## 2022-09-12 ENCOUNTER — OFFICE VISIT (OUTPATIENT)
Dept: FAMILY MEDICINE CLINIC | Facility: CLINIC | Age: 67
End: 2022-09-12

## 2022-09-12 VITALS
SYSTOLIC BLOOD PRESSURE: 138 MMHG | TEMPERATURE: 96.9 F | BODY MASS INDEX: 25.91 KG/M2 | DIASTOLIC BLOOD PRESSURE: 88 MMHG | WEIGHT: 171 LBS | HEART RATE: 68 BPM | HEIGHT: 68 IN

## 2022-09-12 DIAGNOSIS — M10.071 ACUTE IDIOPATHIC GOUT OF RIGHT ANKLE: Chronic | ICD-10-CM

## 2022-09-12 DIAGNOSIS — R73.01 IMPAIRED FASTING GLUCOSE: Chronic | ICD-10-CM

## 2022-09-12 DIAGNOSIS — T14.8XXA MUSCLE STRAIN: ICD-10-CM

## 2022-09-12 DIAGNOSIS — E78.2 MIXED HYPERLIPIDEMIA: Chronic | ICD-10-CM

## 2022-09-12 DIAGNOSIS — I10 ESSENTIAL HYPERTENSION: Primary | Chronic | ICD-10-CM

## 2022-09-12 DIAGNOSIS — E66.3 OVERWEIGHT (BMI 25.0-29.9): Chronic | ICD-10-CM

## 2022-09-12 PROCEDURE — 99214 OFFICE O/P EST MOD 30 MIN: CPT | Performed by: INTERNAL MEDICINE

## 2022-09-12 NOTE — PROGRESS NOTES
"Chief Complaint  Follow-up (6 month) and Pain (Left side of body. States went kayaking and thinks he overdid it )    Subjective        History of Present Illness     Don Oscar presents to the office for 6-month follow up on hyperlipidemia, hypertension, gout and impaired fasting glucose, among other issues. He continues on prophylactic allopurinol for gout.  Uric acid 6.7.  He reports one mild gout attack in the past six months only lasting a couple of days. He is quite hard of hearing due to sensorineural hearing loss, requiring hearing aids.  Denies GERD symptoms of constipation.  He reports some left-sided rib due to some recent kayaking and lifting his kayak.      He is up to date on colonoscopy until 02/2023.  Recommended updated COVID vaccine and flu vaccine next month, although, he may want to take at 2-week interval between vaccines.       Weight is down 7 pounds in the past six months.  He has been limiting portion sizes and working out in the gym.  With combination of weight loss and summer activity, impaired fasting glucose has not progressed and has actually improved.       The patient's relevant past medical, surgical, and social history was reviewed in Epic.   Lab results are reviewed with the patient today. CBC  Fasting glucose 116.  A1c 5.5.  Normal renal and liver function.   Cholesterol at goal with Lipitor.  Normal thyroid screen.  PSA reveal no evidence of prostate cancer.       Objective   Vital Signs:  /88   Pulse 68   Temp 96.9 °F (36.1 °C) (Tympanic)   Ht 172.7 cm (68\")   Wt 77.6 kg (171 lb)   BMI 26.00 kg/m²   Estimated body mass index is 26 kg/m² as calculated from the following:    Height as of this encounter: 172.7 cm (68\").    Weight as of this encounter: 77.6 kg (171 lb).          Physical Exam  Vitals reviewed.   Constitutional:       General: He is not in acute distress.     Appearance: He is well-developed.      Comments: Pleasant male.     HENT:      Head: " Normocephalic and atraumatic.      Ears:      Comments: Hard of hearing     Nose:      Right Sinus: No maxillary sinus tenderness or frontal sinus tenderness.      Left Sinus: No maxillary sinus tenderness or frontal sinus tenderness.      Mouth/Throat:      Mouth: No oral lesions.      Pharynx: Uvula midline.      Tonsils: No tonsillar exudate.   Eyes:      Conjunctiva/sclera: Conjunctivae normal.      Pupils: Pupils are equal, round, and reactive to light.   Neck:      Thyroid: No thyroid mass or thyromegaly.      Vascular: No carotid bruit or JVD.      Trachea: Trachea normal. No tracheal deviation.   Cardiovascular:      Rate and Rhythm: Normal rate and regular rhythm.  No extrasystoles are present.     Chest Wall: PMI is not displaced.      Heart sounds: Normal heart sounds. No murmur heard.  Pulmonary:      Effort: Pulmonary effort is normal. No accessory muscle usage or respiratory distress.      Breath sounds: Normal breath sounds. No decreased breath sounds, wheezing, rhonchi or rales.   Abdominal:      General: Bowel sounds are normal. There is no distension.      Palpations: Abdomen is soft.      Tenderness: There is no abdominal tenderness.   Musculoskeletal:      Cervical back: Neck supple.   Lymphadenopathy:      Cervical: No cervical adenopathy.   Skin:     General: Skin is warm and dry.      Findings: No rash.      Nails: There is no clubbing.   Neurological:      Mental Status: He is alert and oriented to person, place, and time.      Cranial Nerves: No cranial nerve deficit.      Coordination: Coordination normal.   Psychiatric:         Speech: Speech normal.         Behavior: Behavior normal.         Thought Content: Thought content normal.         Judgment: Judgment normal.            Result Review :    CMP    CMP 3/3/22 9/8/22   Glucose 118 (A) 116 (A)   BUN 21 18   Creatinine 1.04 1.01   Sodium 137 137   Potassium 4.3 4.4   Chloride 104 105   Calcium 9.0 9.2   Albumin 4.00 4.00   Total  Bilirubin 0.6 0.5   Alkaline Phosphatase 74 81   AST (SGOT) 24 23   ALT (SGPT) 31 29   (A) Abnormal value            CBC w/diff    CBC w/Diff 3/3/22 9/8/22   WBC 6.33 7.19   RBC 4.57 4.70   Hemoglobin 13.8 14.2   Hematocrit 41.3 42.3   MCV 90.4 90.0   MCH 30.2 30.2   MCHC 33.4 33.6   RDW 12.6 12.8   Platelets 283 265   Neutrophil Rel % 60.6 65.0   Lymphocyte Rel % 27.8 23.6   Monocyte Rel % 8.7 8.2   Eosinophil Rel % 2.4 2.8   Basophil Rel % 0.5 0.4           Lipid Panel    Lipid Panel 3/3/22 9/8/22   Total Cholesterol  141 (A)   Triglycerides  78   HDL Cholesterol  37 (A)   VLDL Cholesterol  15   LDL Cholesterol  101 (A) 89   LDL/HDL Ratio  2.39   (A) Abnormal value            TSH    TSH 9/8/22   TSH 2.180           A1C Last 3 Results    HGBA1C Last 3 Results 3/3/22 9/8/22   Hemoglobin A1C 5.80 (A) 5.50   (A) Abnormal value            PSA    PSA 9/8/22   PSA 0.805                     Assessment and Plan   Diagnoses and all orders for this visit:    1. Essential hypertension (Primary)  -     CBC Auto Differential; Future  -     Comprehensive Metabolic Panel; Future    2. Impaired fasting glucose  -     Hemoglobin A1c; Future    3. Mixed hyperlipidemia  -     LDL Cholesterol, Direct; Future    4. Overweight (BMI 25.0-29.9)  -     Comprehensive Metabolic Panel; Future    5. Acute idiopathic gout of right ankle  -     Comprehensive Metabolic Panel; Future  -     Uric Acid; Future    6. Muscle strain               Continue prophylactic allopurinol for gout, which is stable/well-controlled with only one mild flare in past six months.  Uric acid 6.7.         Continue Lipitor for hyperlipidemia and Cozaar for hypertension . Pursue sodium restriction.  Congratulated on weight loss.  Continue regular exercise and portion sizes to help delay progression of IFG and help manage lipids.     Recommended flu vaccines and updated COVID vaccines next month, although, he may want to take at 2-week interval between vaccines to lessen  any side effects.     Return in six months for routine follow up with fasting labs one week prior or sooner if needed.     [facility] Dr. Ríos [facility]     Follow Up   Return in about 6 months (around 3/12/2023) for Follow up in six months with labs one week prior., Next scheduled follow up - labs 1 week prior.  Patient was given instructions and counseling regarding his condition or for health maintenance advice. Please see specific information pulled into the AVS if appropriate.

## 2022-09-19 RX ORDER — ALLOPURINOL 100 MG/1
100 TABLET ORAL DAILY
Qty: 90 TABLET | Refills: 3 | Status: SHIPPED | OUTPATIENT
Start: 2022-09-19

## 2022-10-26 ENCOUNTER — CLINICAL SUPPORT (OUTPATIENT)
Dept: FAMILY MEDICINE CLINIC | Facility: CLINIC | Age: 67
End: 2022-10-26

## 2022-10-26 DIAGNOSIS — Z23 NEED FOR VACCINATION: Primary | ICD-10-CM

## 2022-10-26 PROCEDURE — G0008 ADMIN INFLUENZA VIRUS VAC: HCPCS | Performed by: INTERNAL MEDICINE

## 2022-10-26 PROCEDURE — 90662 IIV NO PRSV INCREASED AG IM: CPT | Performed by: INTERNAL MEDICINE

## 2022-11-23 RX ORDER — LOSARTAN POTASSIUM 50 MG/1
TABLET ORAL
Qty: 180 TABLET | Refills: 0 | Status: SHIPPED | OUTPATIENT
Start: 2022-11-23 | End: 2023-01-20

## 2023-01-20 DIAGNOSIS — I10 ESSENTIAL HYPERTENSION: Primary | ICD-10-CM

## 2023-01-20 RX ORDER — FLUTICASONE PROPIONATE 50 MCG
SPRAY, SUSPENSION (ML) NASAL
Qty: 16 G | Refills: 0 | Status: SHIPPED | OUTPATIENT
Start: 2023-01-20 | End: 2023-02-20

## 2023-01-20 RX ORDER — ATORVASTATIN CALCIUM 10 MG/1
TABLET, FILM COATED ORAL
Qty: 90 TABLET | Refills: 0 | Status: SHIPPED | OUTPATIENT
Start: 2023-01-20

## 2023-01-20 RX ORDER — LOSARTAN POTASSIUM 50 MG/1
TABLET ORAL
Qty: 180 TABLET | Refills: 1 | Status: SHIPPED | OUTPATIENT
Start: 2023-01-20 | End: 2023-02-01 | Stop reason: ALTCHOICE

## 2023-02-01 ENCOUNTER — OFFICE VISIT (OUTPATIENT)
Dept: FAMILY MEDICINE CLINIC | Facility: CLINIC | Age: 68
End: 2023-02-01
Payer: MEDICARE

## 2023-02-01 VITALS
HEART RATE: 72 BPM | WEIGHT: 173.4 LBS | TEMPERATURE: 97.8 F | DIASTOLIC BLOOD PRESSURE: 100 MMHG | SYSTOLIC BLOOD PRESSURE: 174 MMHG | HEIGHT: 68 IN | BODY MASS INDEX: 26.28 KG/M2

## 2023-02-01 DIAGNOSIS — S16.1XXA CERVICAL STRAIN, ACUTE, INITIAL ENCOUNTER: ICD-10-CM

## 2023-02-01 DIAGNOSIS — M10.071 ACUTE IDIOPATHIC GOUT OF RIGHT ANKLE: Chronic | ICD-10-CM

## 2023-02-01 DIAGNOSIS — M62.838 MUSCLE SPASMS OF NECK: ICD-10-CM

## 2023-02-01 DIAGNOSIS — R42 EPISODE OF DIZZINESS: ICD-10-CM

## 2023-02-01 DIAGNOSIS — R10.13 DYSPEPSIA: ICD-10-CM

## 2023-02-01 DIAGNOSIS — I10 ESSENTIAL HYPERTENSION: Chronic | ICD-10-CM

## 2023-02-01 DIAGNOSIS — R11.0 NAUSEA: ICD-10-CM

## 2023-02-01 DIAGNOSIS — R73.01 IMPAIRED FASTING GLUCOSE: Chronic | ICD-10-CM

## 2023-02-01 DIAGNOSIS — S46.912A LEFT SHOULDER STRAIN, INITIAL ENCOUNTER: Primary | ICD-10-CM

## 2023-02-01 PROCEDURE — 99214 OFFICE O/P EST MOD 30 MIN: CPT | Performed by: INTERNAL MEDICINE

## 2023-02-01 RX ORDER — CELECOXIB 200 MG/1
200 CAPSULE ORAL DAILY
Qty: 10 CAPSULE | Refills: 0 | Status: SHIPPED | OUTPATIENT
Start: 2023-02-01

## 2023-02-01 RX ORDER — TIZANIDINE 4 MG/1
TABLET ORAL
Qty: 10 TABLET | Refills: 0 | Status: SHIPPED | OUTPATIENT
Start: 2023-02-01

## 2023-02-01 RX ORDER — FAMOTIDINE 40 MG/1
40 TABLET, FILM COATED ORAL EVERY MORNING
Qty: 90 TABLET | Refills: 3 | Status: SHIPPED | OUTPATIENT
Start: 2023-02-01

## 2023-02-01 RX ORDER — VALSARTAN 160 MG/1
160 TABLET ORAL DAILY
Qty: 90 TABLET | Refills: 3 | Status: SHIPPED | OUTPATIENT
Start: 2023-02-01

## 2023-02-01 NOTE — PROGRESS NOTES
"Chief Complaint  Pain (Feels like needles in arms and back) and Hypertension (States it has been elevated. States he just doesn't \" feel good\" )    Subjective        History of Present Illness     Don Oscar presents to the clinic reporting 4-5 days of left shoulder discomfort and left posterior neck muscle spasm with some mild paresthesias/radicular symptoms  left arm.  Pain/discomfort wakes him during the night at times.  He loaded and unloaded a load of river rock about a week ago, just prior to onset of pain.  Denies chest pain while he was exerting himself with this activity.  He has been taking some Tylenol for the pain.  Cholesterol was acceptable level with Lipitor with labs last fall.  He is due repeat lipids and other labs next month.     He is also reporting waxing and waning episodes of nausea.  Denies vomiting.  He is not currently taking antacids.      Patient states BP has been running above goal.  He reports an isolated episode of transient dizziness a couple of mornings ago, but no recurrence.  BP this morning in the office is 174/100.   Patient took morning medicines including Cozaar 50 mg about an one hour prior to appointment this morning.  He took two doses of Cozaar yesterday, but states he only takes one dose of Cozaar on most days due to second dose making him \"feel bad\".            Objective   Vital Signs:  /100 Comment: took meds at 9am  Pulse 72   Temp 97.8 °F (36.6 °C)   Ht 172.7 cm (68\")   Wt 78.7 kg (173 lb 6.4 oz)   BMI 26.37 kg/m²   Estimated body mass index is 26.37 kg/m² as calculated from the following:    Height as of this encounter: 172.7 cm (68\").    Weight as of this encounter: 78.7 kg (173 lb 6.4 oz).             Physical Exam  Constitutional:       General: He is not in acute distress.     Appearance: He is well-developed.      Comments: Very pleasant, hard of hearing.   HENT:      Head: Normocephalic and atraumatic.      Nose: Nose normal.      Mouth/Throat: "      Pharynx: No oropharyngeal exudate.   Eyes:      Pupils: Pupils are equal, round, and reactive to light.   Neck:      Thyroid: No thyromegaly.      Vascular: No JVD.   Cardiovascular:      Rate and Rhythm: Normal rate and regular rhythm.      Heart sounds: Normal heart sounds.   Pulmonary:      Effort: Pulmonary effort is normal. No accessory muscle usage or respiratory distress.      Breath sounds: Normal breath sounds. No wheezing or rales.   Abdominal:      General: Bowel sounds are normal. There is no distension.      Palpations: Abdomen is soft.      Tenderness: There is no abdominal tenderness.   Musculoskeletal:      Cervical back: Neck supple.   Lymphadenopathy:      Cervical: No cervical adenopathy.   Neurological:      Mental Status: He is alert and oriented to person, place, and time.      Cranial Nerves: No cranial nerve deficit.   Psychiatric:         Speech: Speech normal.         Behavior: Behavior normal.         Thought Content: Thought content normal.         Judgment: Judgment normal.            Result Review :    Renal Profile    Renal Profile 3/3/22 9/8/22   BUN 21 18   Creatinine 1.04 1.01           A1C Last 3 Results    HGBA1C Last 3 Results 3/3/22 9/8/22   Hemoglobin A1C 5.80 (A) 5.50   (A) Abnormal value                Future Appointments   Date Time Provider Department Center   3/21/2023  9:30 AM Abel Ríos MD [facility]              Assessment and Plan   Diagnoses and all orders for this visit:    1. Left shoulder strain, initial encounter (Primary)    2. Cervical strain, acute, initial encounter    3. Muscle spasms of neck    4. Essential hypertension    5. Dyspepsia    6. Nausea    7. Acute idiopathic gout of right ankle    8. Impaired fasting glucose    Other orders  -     valsartan (Diovan) 160 MG tablet; Take 1 tablet by mouth Daily. For blood pressure. Replaces losartan  Dispense: 90 tablet; Refill: 3  -     famotidine (PEPCID) 40 MG tablet; Take 1 tablet by mouth Every  Morning.  Dispense: 90 tablet; Refill: 3  -     celecoxib (CeleBREX) 200 MG capsule; Take 1 capsule by mouth Daily. With food for shoulder pain  Dispense: 10 capsule; Refill: 0  -     tiZANidine (ZANAFLEX) 4 MG tablet; 1 qhs prn muscle spasms  Dispense: 10 tablet; Refill: 0                To help improve BP management,  we will switch patient from Cozaar to Diovan 160 mg q.d. I will continue to try to avoid/minimize use of diuretics due to gout, which is stable and well-controlled with allopurinol.  Monitor BP at rest at home and keep a diary to bring back to the office with his office visit next month.  I asked him to notify us prior to next appointment if BP is not consistently at goal with changes made today.       He is to notify me if he has recurrence of episodes of dizziness.  This was one isolated event a couple weeks ago and has not recurred.  No additional evaluation at this time.    For the nausea and dyspepsia, a prescription sent for Pepcid 40 mg to take one q.d.  This will also help with GI protection while on the Celebrex.       For the neck/cervical strain and left shoulder strain, prescription sent for Celebrex 200 mg to take one q.d. and Zanaflex 4 mg to take q.h.s. prn muscle spasms.  Demonstrated some deep muscle massage to help relieve muscle spasm.  No steroids due to his IFG, which is stable.    Return next month (03/21/2023) for routine follow up with fasting labs one week prior or sooner if needed.     Scribed for Dr. Ríos by Ceci Hensley [facility].     Follow Up   Return if symptoms worsen or fail to improve, for Next scheduled follow up.  Patient was given instructions and counseling regarding his condition or for health maintenance advice. Please see specific information pulled into the AVS if appropriate.

## 2023-02-20 RX ORDER — FLUTICASONE PROPIONATE 50 MCG
SPRAY, SUSPENSION (ML) NASAL
Qty: 16 G | Refills: 5 | Status: SHIPPED | OUTPATIENT
Start: 2023-02-20

## 2023-03-17 ENCOUNTER — LAB (OUTPATIENT)
Dept: LAB | Facility: OTHER | Age: 68
End: 2023-03-17
Payer: MEDICARE

## 2023-03-17 DIAGNOSIS — E66.3 OVERWEIGHT (BMI 25.0-29.9): Chronic | ICD-10-CM

## 2023-03-17 DIAGNOSIS — M10.071 ACUTE IDIOPATHIC GOUT OF RIGHT ANKLE: Chronic | ICD-10-CM

## 2023-03-17 DIAGNOSIS — R73.01 IMPAIRED FASTING GLUCOSE: Chronic | ICD-10-CM

## 2023-03-17 DIAGNOSIS — E78.2 MIXED HYPERLIPIDEMIA: Chronic | ICD-10-CM

## 2023-03-17 DIAGNOSIS — I10 ESSENTIAL HYPERTENSION: Chronic | ICD-10-CM

## 2023-03-17 LAB
ALBUMIN SERPL-MCNC: 3.9 G/DL (ref 3.5–5)
ALBUMIN/GLOB SERPL: 1.3 G/DL (ref 1.1–1.8)
ALP SERPL-CCNC: 98 U/L (ref 38–126)
ALT SERPL W P-5'-P-CCNC: 39 U/L
ANION GAP SERPL CALCULATED.3IONS-SCNC: 6 MMOL/L (ref 5–15)
ARTICHOKE IGE QN: 116 MG/DL (ref 0–100)
AST SERPL-CCNC: 25 U/L (ref 17–59)
BASOPHILS # BLD AUTO: 0.02 10*3/MM3 (ref 0–0.2)
BASOPHILS NFR BLD AUTO: 0.3 % (ref 0–1.5)
BILIRUB SERPL-MCNC: 0.6 MG/DL (ref 0.2–1.3)
BUN SERPL-MCNC: 21 MG/DL (ref 7–23)
BUN/CREAT SERPL: 18.4 (ref 7–25)
CALCIUM SPEC-SCNC: 8.9 MG/DL (ref 8.4–10.2)
CHLORIDE SERPL-SCNC: 103 MMOL/L (ref 101–112)
CO2 SERPL-SCNC: 28 MMOL/L (ref 22–30)
CREAT SERPL-MCNC: 1.14 MG/DL (ref 0.7–1.3)
DEPRECATED RDW RBC AUTO: 40.6 FL (ref 37–54)
EGFRCR SERPLBLD CKD-EPI 2021: 70.1 ML/MIN/1.73
EOSINOPHIL # BLD AUTO: 0.2 10*3/MM3 (ref 0–0.4)
EOSINOPHIL NFR BLD AUTO: 3.1 % (ref 0.3–6.2)
ERYTHROCYTE [DISTWIDTH] IN BLOOD BY AUTOMATED COUNT: 12.6 % (ref 12.3–15.4)
GLOBULIN UR ELPH-MCNC: 2.9 GM/DL (ref 2.3–3.5)
GLUCOSE SERPL-MCNC: 115 MG/DL (ref 70–99)
HBA1C MFR BLD: 6.1 % (ref 4.8–5.6)
HCT VFR BLD AUTO: 41 % (ref 37.5–51)
HGB BLD-MCNC: 13.8 G/DL (ref 13–17.7)
LYMPHOCYTES # BLD AUTO: 1.3 10*3/MM3 (ref 0.7–3.1)
LYMPHOCYTES NFR BLD AUTO: 20.3 % (ref 19.6–45.3)
MCH RBC QN AUTO: 30.8 PG (ref 26.6–33)
MCHC RBC AUTO-ENTMCNC: 33.7 G/DL (ref 31.5–35.7)
MCV RBC AUTO: 91.5 FL (ref 79–97)
MONOCYTES # BLD AUTO: 0.63 10*3/MM3 (ref 0.1–0.9)
MONOCYTES NFR BLD AUTO: 9.8 % (ref 5–12)
NEUTROPHILS NFR BLD AUTO: 4.26 10*3/MM3 (ref 1.7–7)
NEUTROPHILS NFR BLD AUTO: 66.5 % (ref 42.7–76)
PLATELET # BLD AUTO: 287 10*3/MM3 (ref 140–450)
PMV BLD AUTO: 8.4 FL (ref 6–12)
POTASSIUM SERPL-SCNC: 4.5 MMOL/L (ref 3.4–5)
PROT SERPL-MCNC: 6.8 G/DL (ref 6.3–8.6)
RBC # BLD AUTO: 4.48 10*6/MM3 (ref 4.14–5.8)
SODIUM SERPL-SCNC: 137 MMOL/L (ref 137–145)
URATE SERPL-MCNC: 6.7 MG/DL (ref 3.5–8.5)
WBC NRBC COR # BLD: 6.41 10*3/MM3 (ref 3.4–10.8)

## 2023-03-17 PROCEDURE — 85025 COMPLETE CBC W/AUTO DIFF WBC: CPT | Performed by: INTERNAL MEDICINE

## 2023-03-17 PROCEDURE — 36415 COLL VENOUS BLD VENIPUNCTURE: CPT | Performed by: INTERNAL MEDICINE

## 2023-03-17 PROCEDURE — 83036 HEMOGLOBIN GLYCOSYLATED A1C: CPT | Performed by: INTERNAL MEDICINE

## 2023-03-17 PROCEDURE — 84550 ASSAY OF BLOOD/URIC ACID: CPT | Performed by: INTERNAL MEDICINE

## 2023-03-17 PROCEDURE — 83721 ASSAY OF BLOOD LIPOPROTEIN: CPT | Performed by: INTERNAL MEDICINE

## 2023-03-17 PROCEDURE — 80053 COMPREHEN METABOLIC PANEL: CPT | Performed by: INTERNAL MEDICINE

## 2023-03-21 ENCOUNTER — OFFICE VISIT (OUTPATIENT)
Dept: FAMILY MEDICINE CLINIC | Facility: CLINIC | Age: 68
End: 2023-03-21
Payer: MEDICARE

## 2023-03-21 VITALS
TEMPERATURE: 98.8 F | DIASTOLIC BLOOD PRESSURE: 86 MMHG | HEIGHT: 68 IN | SYSTOLIC BLOOD PRESSURE: 150 MMHG | HEART RATE: 53 BPM | WEIGHT: 174 LBS | BODY MASS INDEX: 26.37 KG/M2 | OXYGEN SATURATION: 99 %

## 2023-03-21 DIAGNOSIS — R73.01 IMPAIRED FASTING GLUCOSE: Chronic | ICD-10-CM

## 2023-03-21 DIAGNOSIS — M10.071 ACUTE IDIOPATHIC GOUT OF RIGHT ANKLE: ICD-10-CM

## 2023-03-21 DIAGNOSIS — I10 ESSENTIAL HYPERTENSION: Chronic | ICD-10-CM

## 2023-03-21 DIAGNOSIS — H65.02 ACUTE SEROUS OTITIS MEDIA OF LEFT EAR, RECURRENCE NOT SPECIFIED: ICD-10-CM

## 2023-03-21 DIAGNOSIS — E66.3 OVERWEIGHT (BMI 25.0-29.9): Chronic | ICD-10-CM

## 2023-03-21 DIAGNOSIS — M1A.0710 IDIOPATHIC CHRONIC GOUT OF RIGHT ANKLE WITHOUT TOPHUS: Chronic | ICD-10-CM

## 2023-03-21 DIAGNOSIS — Z12.5 SPECIAL SCREENING FOR MALIGNANT NEOPLASM OF PROSTATE: ICD-10-CM

## 2023-03-21 DIAGNOSIS — Z00.00 MEDICARE ANNUAL WELLNESS VISIT, SUBSEQUENT: Primary | ICD-10-CM

## 2023-03-21 DIAGNOSIS — Z12.11 COLON CANCER SCREENING: ICD-10-CM

## 2023-03-21 DIAGNOSIS — Z86.010 HISTORY OF COLON POLYPS: Chronic | ICD-10-CM

## 2023-03-21 DIAGNOSIS — E78.2 MIXED HYPERLIPIDEMIA: Chronic | ICD-10-CM

## 2023-03-21 RX ORDER — MELOXICAM 15 MG/1
15 TABLET ORAL DAILY PRN
Qty: 7 TABLET | Refills: 0 | Status: SHIPPED | OUTPATIENT
Start: 2023-03-21

## 2023-03-21 RX ORDER — PREDNISONE 20 MG/1
20 TABLET ORAL EVERY MORNING
Qty: 7 TABLET | Refills: 0 | Status: SHIPPED | OUTPATIENT
Start: 2023-03-21

## 2023-03-21 NOTE — PATIENT INSTRUCTIONS
Medicare Wellness  Personal Prevention Plan of Service     Date of Office Visit:    Encounter Provider:  Abel Ríos MD  Place of Service:  [facility]  Patient Name: Don Oscar  :  1955    As part of the Medicare Wellness portion of your visit today, we are providing you with this personalized preventive plan of services (PPPS). This plan is based upon recommendations of the [place] Preventive Services Task Force (USPSTF) and the Advisory Committee on Immunization Practices (ACIP).    This lists the preventive care services that should be considered, and provides dates of when you are due. Items listed as completed are up-to-date and do not require any further intervention.    Health Maintenance   Topic Date Due    ZOSTER VACCINE (2 of 2) 2016    COVID-19 Vaccine (4 - Booster for Moderna series) 2022    ANNUAL WELLNESS VISIT  2023    COLORECTAL CANCER SCREENING  2023    LIPID PANEL  2024    TDAP/TD VACCINES (3 - Td or Tdap) 10/26/2025    HEPATITIS C SCREENING  Completed    INFLUENZA VACCINE  Completed    Pneumococcal Vaccine 65+  Completed    AAA SCREEN (ONE-TIME)  Completed       No orders of the defined types were placed in this encounter.      No follow-ups on file.        Exercising to Lose Weight  Getting regular exercise is important for everyone. It is especially important if you are overweight. Being overweight increases your risk of heart disease, stroke, diabetes, high blood pressure, and several types of cancer. Exercising, and reducing the calories you consume, can help you lose weight and improve fitness and health.  Exercise can be moderate or vigorous intensity. To lose weight, most people need to do a certain amount of moderate or vigorous-intensity exercise each week.  How can exercise affect me?  You lose weight when you exercise enough to burn more calories than you eat. Exercise also reduces  body fat and builds muscle. The more muscle you have, the more calories you burn. Exercise also:  Improves mood.  Reduces stress and tension.  Improves your overall fitness, flexibility, and endurance.  Increases bone strength.  Moderate-intensity exercise  Moderate-intensity exercise is any activity that gets you moving enough to burn at least three times more energy (calories) than if you were sitting.  Examples of moderate exercise include:  Walking a mile in 15 minutes.  Doing light yard work.  Biking at an easy pace.  Most people should get at least 150 minutes of moderate-intensity exercise a week to maintain their body weight.  Vigorous-intensity exercise  Vigorous-intensity exercise is any activity that gets you moving enough to burn at least six times more calories than if you were sitting. When you exercise at this intensity, you should be working hard enough that you are not able to carry on a conversation.  Examples of vigorous exercise include:  Running.  Playing a team sport, such as football, basketball, and soccer.  Jumping rope.  Most people should get at least 75 minutes a week of vigorous exercise to maintain their body weight.  What actions can I take to lose weight?  The amount of exercise you need to lose weight depends on:  Your age.  The type of exercise.  Any health conditions you have.  Your overall physical ability.  Talk to your health care provider about how much exercise you need and what types of activities are safe for you.  Nutrition    Make changes to your diet as told by your health care provider or diet and nutrition specialist (dietitian). This may include:  Eating fewer calories.  Eating more protein.  Eating less unhealthy fats.  Eating a diet that includes fresh fruits and vegetables, whole grains, low-fat dairy products, and lean protein.  Avoiding foods with added fat, salt, and sugar.  Drink plenty of water while you exercise to prevent dehydration or heat  stroke.  Activity  Choose an activity that you enjoy and set realistic goals. Your health care provider can help you make an exercise plan that works for you.  Exercise at a moderate or vigorous intensity most days of the week.  The intensity of exercise may vary from person to person. You can tell how intense a workout is for you by paying attention to your breathing and heartbeat. Most people will notice their breathing and heartbeat get faster with more intense exercise.  Do resistance training twice each week, such as:  Push-ups.  Sit-ups.  Lifting weights.  Using resistance bands.  Getting short amounts of exercise can be just as helpful as long, structured periods of exercise. If you have trouble finding time to exercise, try doing these things as part of your daily routine:  Get up, stretch, and walk around every 30 minutes throughout the day.  Go for a walk during your lunch break.  Park your car farther away from your destination.  If you take public transportation, get off one stop early and walk the rest of the way.  Make phone calls while standing up and walking around.  Take the stairs instead of elevators or escalators.  Wear comfortable clothes and shoes with good support.  Do not exercise so much that you hurt yourself, feel dizzy, or get very short of breath.  Where to find more information  [place] Department of Health and Human Services: www.hhs.gov  Centers for Disease Control and Prevention: www.cdc.gov  Contact a health care provider:  Before starting a new exercise program.  If you have questions or concerns about your weight.  If you have a medical problem that keeps you from exercising.  Get help right away if:  You have any of the following while exercising:  Injury.  Dizziness.  Difficulty breathing or shortness of breath that does not go away when you stop exercising.  Chest pain.  Rapid heartbeat.  These symptoms may represent a serious problem that is an emergency. Do not wait to see if  the symptoms will go away. Get medical help right away. Call your local emergency services (911 in the [place] Do not drive yourself to the hospital.  Summary  Getting regular exercise is especially important if you are overweight.  Being overweight increases your risk of heart disease, stroke, diabetes, high blood pressure, and several types of cancer.  Losing weight happens when you burn more calories than you eat.  Reducing the amount of calories you eat, and getting regular moderate or vigorous exercise each week, helps you lose weight.  This information is not intended to replace advice given to you by your health care provider. Make sure you discuss any questions you have with your health care provider.  Document Revised: 02/13/2022 Document Reviewed: 02/13/2022  [employer] Patient Education © 2022 [employer]  Calorie Counting for Weight Loss  Calories are units of energy. Your body needs a certain number of calories from food to keep going throughout the day. When you eat or drink more calories than your body needs, your body stores the extra calories mostly as fat. When you eat or drink fewer calories than your body needs, your body burns fat to get the energy it needs.  Calorie counting means keeping track of how many calories you eat and drink each day. Calorie counting can be helpful if you need to lose weight. If you eat fewer calories than your body needs, you should lose weight. Ask your health care provider what a healthy weight is for you.  For calorie counting to work, you will need to eat the right number of calories each day to lose a healthy amount of weight per week. A dietitian can help you figure out how many calories you need in a day and will suggest ways to reach your calorie goal.  A healthy amount of weight to lose each week is usually 1-2 lb (0.5-0.9 kg). This usually means that your daily calorie intake should be reduced by 500-750 calories.  Eating 1,200-1,500 calories a day can help  most women lose weight.  Eating 1,500-1,800 calories a day can help most men lose weight.  What do I need to know about calorie counting?  Work with your health care provider or dietitian to determine how many calories you should get each day. To meet your daily calorie goal, you will need to:  Find out how many calories are in each food that you would like to eat. Try to do this before you eat.  Decide how much of the food you plan to eat.  Keep a food log. Do this by writing down what you ate and how many calories it had.  To successfully lose weight, it is important to balance calorie counting with a healthy lifestyle that includes regular activity.  Where do I find calorie information?  The number of calories in a food can be found on a Nutrition Facts label. If a food does not have a Nutrition Facts label, try to look up the calories online or ask your dietitian for help.  Remember that calories are listed per serving. If you choose to have more than one serving of a food, you will have to multiply the calories per serving by the number of servings you plan to eat. For example, the label on a package of bread might say that a serving size is 1 slice and that there are 90 calories in a serving. If you eat 1 slice, you will have eaten 90 calories. If you eat 2 slices, you will have eaten 180 calories.  How do I keep a food log?  After each time that you eat, record the following in your food log as soon as possible:  What you ate. Be sure to include toppings, sauces, and other extras on the food.  How much you ate. This can be measured in cups, ounces, or number of items.  How many calories were in each food and drink.  The total number of calories in the food you ate.  Keep your food log near you, such as in a pocket-sized notebook or on an bhavana or website on your mobile phone. Some programs will calculate calories for you and show you how many calories you have left to meet your daily goal.  What are some  portion-control tips?  Know how many calories are in a serving. This will help you know how many servings you can have of a certain food.  Use a measuring cup to measure serving sizes. You could also try weighing out portions on a kitchen scale. With time, you will be able to estimate serving sizes for some foods.  Take time to put servings of different foods on your favorite plates or in your favorite bowls and cups so you know what a serving looks like.  Try not to eat straight from a food's packaging, such as from a bag or box. Eating straight from the package makes it hard to see how much you are eating and can lead to overeating. Put the amount you would like to eat in a cup or on a plate to make sure you are eating the right portion.  Use smaller plates, glasses, and bowls for smaller portions and to prevent overeating.  Try not to multitask. For example, avoid watching TV or using your computer while eating. If it is time to eat, sit down at a table and enjoy your food. This will help you recognize when you are full. It will also help you be more mindful of what and how much you are eating.  What are tips for following this plan?  Reading food labels  Check the calorie count compared with the serving size. The serving size may be smaller than what you are used to eating.  Check the source of the calories. Try to choose foods that are high in protein, fiber, and vitamins, and low in saturated fat, trans fat, and sodium.  Shopping  Read nutrition labels while you shop. This will help you make healthy decisions about which foods to buy.  Pay attention to nutrition labels for low-fat or fat-free foods. These foods sometimes have the same number of calories or more calories than the full-fat versions. They also often have added sugar, starch, or salt to make up for flavor that was removed with the fat.  Make a grocery list of lower-calorie foods and stick to it.  Cooking  Try to cook your favorite foods in a  healthier way. For example, try baking instead of frying.  Use low-fat dairy products.  Meal planning  Use more fruits and vegetables. One-half of your plate should be fruits and vegetables.  Include lean proteins, such as chicken, turkey, and fish.  Lifestyle  Each week, aim to do one of the followin minutes of moderate exercise, such as walking.  75 minutes of vigorous exercise, such as running.  General information  Know how many calories are in the foods you eat most often. This will help you calculate calorie counts faster.  Find a way of tracking calories that works for you. Get creative. Try different apps or programs if writing down calories does not work for you.  What foods should I eat?    Eat nutritious foods. It is better to have a nutritious, high-calorie food, such as an avocado, than a food with few nutrients, such as a bag of potato chips.  Use your calories on foods and drinks that will fill you up and will not leave you hungry soon after eating.  Examples of foods that fill you up are nuts and nut butters, vegetables, lean proteins, and high-fiber foods such as whole grains. High-fiber foods are foods with more than 5 g of fiber per serving.  Pay attention to calories in drinks. Low-calorie drinks include water and unsweetened drinks.  The items listed above may not be a complete list of foods and beverages you can eat. Contact a dietitian for more information.  What foods should I limit?  Limit foods or drinks that are not good sources of vitamins, minerals, or protein or that are high in unhealthy fats. These include:  Candy.  Other sweets.  Sodas, specialty coffee drinks, alcohol, and juice.  The items listed above may not be a complete list of foods and beverages you should avoid. Contact a dietitian for more information.  How do I count calories when eating out?  Pay attention to portions. Often, portions are much larger when eating out. Try these tips to keep portions  smaller:  Consider sharing a meal instead of getting your own.  If you get your own meal, eat only half of it. Before you start eating, ask for a container and put half of your meal into it.  When available, consider ordering smaller portions from the menu instead of full portions.  Pay attention to your food and drink choices. Knowing the way food is cooked and what is included with the meal can help you eat fewer calories.  If calories are listed on the menu, choose the lower-calorie options.  Choose dishes that include vegetables, fruits, whole grains, low-fat dairy products, and lean proteins.  Choose items that are boiled, broiled, grilled, or steamed. Avoid items that are buttered, battered, fried, or served with cream sauce. Items labeled as crispy are usually fried, unless stated otherwise.  Choose water, low-fat milk, unsweetened iced tea, or other drinks without added sugar. If you want an alcoholic beverage, choose a lower-calorie option, such as a glass of wine or light beer.  Ask for dressings, sauces, and syrups on the side. These are usually high in calories, so you should limit the amount you eat.  If you want a salad, choose a garden salad and ask for grilled meats. Avoid extra toppings such as kat, cheese, or fried items. Ask for the dressing on the side, or ask for olive oil and vinegar or lemon to use as dressing.  Estimate how many servings of a food you are given. Knowing serving sizes will help you be aware of how much food you are eating at restaurants.  Where to find more information  Centers for Disease Control and Prevention: www.cdc.gov  [place] Department of Agriculture: myplate.gov  Summary  Calorie counting means keeping track of how many calories you eat and drink each day. If you eat fewer calories than your body needs, you should lose weight.  A healthy amount of weight to lose per week is usually 1-2 lb (0.5-0.9 kg). This usually means reducing your daily calorie intake by 500-750  calories.  The number of calories in a food can be found on a Nutrition Facts label. If a food does not have a Nutrition Facts label, try to look up the calories online or ask your dietitian for help.  Use smaller plates, glasses, and bowls for smaller portions and to prevent overeating.  Use your calories on foods and drinks that will fill you up and not leave you hungry shortly after a meal.  This information is not intended to replace advice given to you by your health care provider. Make sure you discuss any questions you have with your health care provider.  Document Revised: 01/28/2021 Document Reviewed: 01/28/2021  [employer] Patient Education © 2022 [employer]

## 2023-03-21 NOTE — PROGRESS NOTES
The ABCs of the Annual Wellness Visit  Subsequent Medicare Wellness Visit    Subjective    Don Oscar is a 68 y.o. male who presents for a Subsequent Medicare Wellness Visit.    The following portions of the patient's history were reviewed and   updated as appropriate: allergies, current medications, past family history, past medical history, past social history, past surgical history and problem list.    Compared to one year ago, the patient feels his physical   health is the same.    Compared to one year ago, the patient feels his mental   health is the same.    Recent Hospitalizations:  He was not admitted to the hospital during the last year.       Current Medical Providers:  Patient Care Team:  Abel Ríos MD as PCP - General (Internal Medicine)    Outpatient Medications Prior to Visit   Medication Sig Dispense Refill   • albuterol sulfate  (90 Base) MCG/ACT inhaler Inhale 2 puffs Every 4 (Four) Hours As Needed for Wheezing. 1 inhaler 11   • allopurinol (ZYLOPRIM) 100 MG tablet Take 1 tablet by mouth Daily. 90 tablet 3   • aspirin 81 MG EC tablet Take 1 tablet by mouth Daily.     • atorvastatin (LIPITOR) 10 MG tablet TAKE 1 TABLET BY MOUTH ONCE DAILY FOR CHOLESTEROL 90 tablet 0   • celecoxib (CeleBREX) 200 MG capsule Take 1 capsule by mouth Daily. With food for shoulder pain 10 capsule 0   • famotidine (PEPCID) 40 MG tablet Take 1 tablet by mouth Every Morning. 90 tablet 3   • fluticasone (FLONASE) 50 MCG/ACT nasal spray USE 1 SPRAY IN EACH NOSTRIL TWICE DAILY AS DIRECTED 16 g 5   • ondansetron (ZOFRAN) 4 MG tablet Take 1 tablet by mouth Every 8 (Eight) Hours As Needed for Nausea or Vomiting. 20 tablet 0   • tiZANidine (ZANAFLEX) 4 MG tablet 1 qhs prn muscle spasms 10 tablet 0   • valsartan (Diovan) 160 MG tablet Take 1 tablet by mouth Daily. For blood pressure. Replaces losartan 90 tablet 3     No facility-administered medications prior to visit.       No opioid medication identified on active  "medication list. I have reviewed chart for other potential  high risk medication/s and harmful drug interactions in the elderly.          Aspirin is on active medication list. Aspirin use is indicated based on review of current medical condition/s. Pros and cons of this therapy have been discussed today. Benefits of this medication outweigh potential harm.  Patient has been encouraged to continue taking this medication.  .      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  ACP discussion was held with the patient during this visit. Patient does not have an advance directive, information provided.     Objective    Vitals:    23 0914   BP: 150/86   Pulse: 53   Temp: 98.8 °F (37.1 °C)   SpO2: 99%   Weight: 78.9 kg (174 lb)   Height: 172.7 cm (68\")   PainSc:   5   PainLoc: Ankle  Comment: right     Estimated body mass index is 26.46 kg/m² as calculated from the following:    Height as of this encounter: 172.7 cm (68\").    Weight as of this encounter: 78.9 kg (174 lb).    BMI is >= 25 and <30. (Overweight) The following options were offered after discussion;: exercise counseling/recommendations and nutrition counseling/recommendations      Does the patient have evidence of cognitive impairment? No    Lab Results   Component Value Date     (H) 2023     (H) 2023    HGBA1C 6.10 (H) 2023        HEALTH RISK ASSESSMENT    Smoking Status:  Social History     Tobacco Use   Smoking Status Former   • Packs/day: 1.50   • Years: 7.00   • Pack years: 10.50   • Types: Cigarettes   • Quit date:    • Years since quittin.2   • Passive " exposure: Current   Smokeless Tobacco Never     Alcohol Consumption:  Social History     Substance and Sexual Activity   Alcohol Use Yes   • Alcohol/week: 2.0 standard drinks   • Types: 2 Cans of beer per week     Fall Risk Screen:    PEBBLES Fall Risk Assessment was completed, and patient is at LOW risk for falls.Assessment completed on:3/21/2023    Depression Screening:  PHQ-2/PHQ-9 Depression Screening 3/21/2023   Little Interest or Pleasure in Doing Things 0-->not at all   Feeling Down, Depressed or Hopeless 0-->not at all   PHQ-9: Brief Depression Severity Measure Score 0       Health Habits and Functional and Cognitive Screening:  Functional & Cognitive Status 3/21/2023   Do you have difficulty preparing food and eating? No   Do you have difficulty bathing yourself, getting dressed or grooming yourself? No   Do you have difficulty using the toilet? No   Do you have difficulty moving around from place to place? No   Do you have trouble with steps or getting out of a bed or a chair? No   Current Diet Well Balanced Diet   Dental Exam Up to date   Eye Exam Up to date   Exercise (times per week) 4 times per week   Current Exercises Include Yard Work;House Cleaning        Exercise Comment stays busy/gardening    Current Exercise Activities Include -   Do you need help using the phone?  No   Are you deaf or do you have serious difficulty hearing?  Yes   Do you need help with transportation? No   Do you need help shopping? No   Do you need help preparing meals?  No   Do you need help with housework?  No   Do you need help with laundry? No   Do you need help taking your medications? No   Do you need help managing money? No   Do you ever drive or ride in a car without wearing a seat belt? No   Have you felt unusual stress, anger or loneliness in the last month? No   Who do you live with? Spouse   If you need help, do you have trouble finding someone available to you? No   Have you been bothered in the last four weeks by  sexual problems? No   Do you have difficulty concentrating, remembering or making decisions? No       Age-appropriate Screening Schedule:  Refer to the list below for future screening recommendations based on patient's age, sex and/or medical conditions. Orders for these recommended tests are listed in the plan section. The patient has been provided with a written plan.    Health Maintenance   Topic Date Due   • COLORECTAL CANCER SCREENING  02/16/2023   • ZOSTER VACCINE (2 of 2) 03/21/2023 (Originally 12/21/2016)   • COVID-19 Vaccine (4 - Booster for Moderna series) 03/23/2023 (Originally 3/21/2022)   • LIPID PANEL  03/17/2024   • ANNUAL WELLNESS VISIT  03/21/2024   • TDAP/TD VACCINES (3 - Td or Tdap) 10/26/2025   • HEPATITIS C SCREENING  Completed   • INFLUENZA VACCINE  Completed   • Pneumococcal Vaccine 65+  Completed   • AAA SCREEN (ONE-TIME)  Completed                CMS Preventative Services Quick Reference  Risk Factors Identified During Encounter  Hearing Problem: Hearing aids in place.    Colon cancer screening:    Updated COVID vaccine available.  He is given information, but declines today.  He will consider getting the vaccine at later date.    Declines Shingrix.     Patient is due colon cancer screening.   Colonoscopy in 2011 by Dr. Hayden revealed one polyp.  Refer back to Dr. Hayden for colonoscopy.      The above risks/problems have been discussed with the patient.  Pertinent information has been shared with the patient in the After Visit Summary.  An After Visit Summary and PPPS were made available to the patient.    Follow Up:   Next Medicare Wellness visit to be scheduled in 1 year.       Additional E&M Note during same encounter follows:  Patient has multiple medical problems which are significant and separately identifiable that require additional work above and beyond the Medicare Wellness Visit.      Chief Complaint  Follow-up (6 month), Medicare Wellness-subsequent, and Ankle Pain (Right and  "he thinks it is gout. He states takes Allopurinol daily)    Subjective        HPI  Don Oscar is also being seen today for 6-month follow up on hyperlipidemia, hypertension, gout and impaired fasting glucose, among other issues. He continues on prophylactic allopurinol for gout.  He had a flare of gout left ankle approximately a week ago and one other episode over past six months. He felt the kidney beans in chili triggered the flare, although, has also been having frequent servings of orange juice.  He has improving symptoms of most recent gout flare, although, continues to have warmth, tenderness, and mild erythema right lateral malleolus, but no tophi noted. GERD symptoms adequately controlled with Pepcid.     Patient reports a new issue of left ear pain and pressure for past week.  He has some mucous behind TM left ear canal, for which I asked him to resume use of the Flonase nasal spray.    Patient was treated in ER at [facility] and diagnosed with bilateral lower lobe pneumonia last month.  Symptoms resolved with course of Cefzil and Medrol Dosepak.    Weight is up 3 pounds in the past six months.   His IFG has progressed closer to diabetic level.  He admits to eating more sweets and carbohydrates over past few months.     BP above goal this morning at 150/86.  He occasionally checks BP at home, but doesn't recall the numbers.  He states he has already had two pots of coffee this morning and felt this could have played a role.      Lab results are reviewed with the patient today. CBC unremarkable.  Fasting glucose 115.  A1c 6.1.   Normal renal and liver function.  LDL cholesterol            Objective   Vital Signs:  /86   Pulse 53   Temp 98.8 °F (37.1 °C)   Ht 172.7 cm (68\")   Wt 78.9 kg (174 lb)   SpO2 99%   BMI 26.46 kg/m²     Physical Exam  Vitals reviewed.   Constitutional:       General: He is not in acute distress.     Appearance: He is well-developed.   HENT:      Head: Normocephalic and " atraumatic.      Ears:      Comments: Mucous behind TM left ear canal       Nose:      Right Sinus: No maxillary sinus tenderness or frontal sinus tenderness.      Left Sinus: No maxillary sinus tenderness or frontal sinus tenderness.      Mouth/Throat:      Mouth: No oral lesions.      Pharynx: Uvula midline.      Tonsils: No tonsillar exudate.   Eyes:      Conjunctiva/sclera: Conjunctivae normal.      Pupils: Pupils are equal, round, and reactive to light.   Neck:      Thyroid: No thyroid mass or thyromegaly.      Vascular: No carotid bruit or JVD.      Trachea: Trachea normal. No tracheal deviation.   Cardiovascular:      Rate and Rhythm: Normal rate and regular rhythm.  No extrasystoles are present.     Chest Wall: PMI is not displaced.      Heart sounds: Normal heart sounds. No murmur heard.  Pulmonary:      Effort: Pulmonary effort is normal. No accessory muscle usage or respiratory distress.      Breath sounds: Normal breath sounds. No decreased breath sounds, wheezing, rhonchi or rales.      Comments: A few chronic lung sounds.   Abdominal:      General: Bowel sounds are normal. There is no distension.      Palpations: Abdomen is soft.      Tenderness: There is no abdominal tenderness.   Musculoskeletal:      Cervical back: Neck supple.      Comments: Warmth, tenderness, and [id number] right lateral malleolus.  No tophi noted.      Lymphadenopathy:      Cervical: No cervical adenopathy.   Skin:     General: Skin is warm and dry.      Findings: No rash.      Nails: There is no clubbing.   Neurological:      Mental Status: He is alert and oriented to person, place, and time.      Cranial Nerves: No cranial nerve deficit.      Coordination: Coordination normal.   Psychiatric:         Speech: Speech normal.         Behavior: Behavior normal.         Thought Content: Thought content normal.         Judgment: Judgment normal.                CMP    CMP 9/8/22 2/18/23 3/17/23   Glucose 116 (A)  115 (A)   Glucose  115  (A)    BUN 18 14 21   Creatinine 1.01 1.2 1.14   eGFR 81.5  70.1   Sodium 137 137 137   Potassium 4.4 4.0 4.5   Chloride 105 100 103   Calcium 9.2 9.1 8.9   Total Protein 6.5 7.2 6.8   Albumin 4.00 3.8 3.9   Globulin 2.5  2.9   Total Bilirubin 0.5 0.60 0.6   Alkaline Phosphatase 81 107 98   AST (SGOT) 23 21 25   ALT (SGPT) 29 41 39   Albumin/Globulin Ratio 1.6  1.3   BUN/Creatinine Ratio 17.8  18.4   Anion Gap 3.0 (A)  6.0   (A) Abnormal value       Comments are available for some flowsheets but are not being displayed.           CBC w/diff    CBC w/Diff 9/8/22 3/17/23   WBC 7.19 6.41   RBC 4.70 4.48   Hemoglobin 14.2 13.8   Hematocrit 42.3 41.0   MCV 90.0 91.5   MCH 30.2 30.8   MCHC 33.6 33.7   RDW 12.8 12.6   Platelets 265 287   Neutrophil Rel % 65.0 66.5   Lymphocyte Rel % 23.6 20.3   Monocyte Rel % 8.2 9.8   Eosinophil Rel % 2.8 3.1   Basophil Rel % 0.4 0.3           Lipid Panel    Lipid Panel 9/8/22 3/17/23   Total Cholesterol 141 (A)    Triglycerides 78    HDL Cholesterol 37 (A)    VLDL Cholesterol 15    LDL Cholesterol  89 116 (A)   LDL/HDL Ratio 2.39    (A) Abnormal value            TSH    TSH 9/8/22   TSH 2.180           A1C Last 3 Results    HGBA1C Last 3 Results 9/8/22 3/17/23   Hemoglobin A1C 5.50 6.10 (A)   (A) Abnormal value            PSA    PSA 9/8/22   PSA 0.805                      Assessment and Plan   Diagnoses and all orders for this visit:    1. Medicare annual wellness visit, subsequent (Primary)    2. Colon cancer screening  -     Ambulatory Referral to Gastroenterology    3. Acute idiopathic gout of right ankle  -     Comprehensive Metabolic Panel; Future    4. Mixed hyperlipidemia  -     Lipid Panel; Future  -     TSH; Future    5. Essential hypertension  -     CBC Auto Differential; Future  -     Comprehensive Metabolic Panel; Future    6. Impaired fasting glucose  -     Comprehensive Metabolic Panel; Future  -     Hemoglobin A1c; Future    7. Overweight (BMI 25.0-29.9)  -      Comprehensive Metabolic Panel; Future    8. Idiopathic chronic gout of right ankle without tophus  -     Comprehensive Metabolic Panel; Future  -     Uric Acid; Future    9. History of colon polyps  -     Ambulatory Referral to Gastroenterology    10. Special screening for malignant neoplasm of prostate  -     PSA Screen; Future    Other orders  -     meloxicam (MOBIC) 15 MG tablet; Take 1 tablet by mouth Daily As Needed (pain). Take with food  Dispense: 7 tablet; Refill: 0  -     predniSONE (DELTASONE) 20 MG tablet; Take 1 tablet by mouth Every Morning.  Dispense: 7 tablet; Refill: 0                For the acute gout flare, prescriptions sent for prednisone 20 mg to take one tablet q.d. x 7 days and Mobic 15 mg to take one q.d. with food x 7 days.  Notify us if symptoms do not resolve.  Continue allopurinol daily for prophylaxis.  Uric acid is at goal.    Recommended he use the Flonase nasal spray one spray each nostril b.i.d. to help address the left ear effusion consistent with serous otitis media.  No antibiotic needed at this time    Continue Diovan 160 mg q.d. Patient agrees to monitor BP at rest 2 to 3 times weekly and notify us if not at goal of systolic consistently <130 and diastolic <85.      Continue Lipitor 10 mg daily for now.  Reduce carbohydrates and concentrated sweets, which will not only lower cholesterol, but will also help delay progression of IFG.        Return in 6 months for routine follow up with fasting labs one week prior or sooner if needed.     Scribed for Dr. Ríos by Ceci Hensley [facility].     Follow Up   Return in about 6 months (around 9/21/2023) for Next scheduled follow up - labs 1 week prior.  Patient was given instructions and counseling regarding his condition or for health maintenance advice. Please see specific information pulled into the AVS if appropriate.

## 2023-04-17 RX ORDER — ATORVASTATIN CALCIUM 10 MG/1
TABLET, FILM COATED ORAL
Qty: 90 TABLET | Refills: 3 | Status: SHIPPED | OUTPATIENT
Start: 2023-04-17

## 2023-05-03 ENCOUNTER — TELEPHONE (OUTPATIENT)
Dept: FAMILY MEDICINE CLINIC | Facility: CLINIC | Age: 68
End: 2023-05-03
Payer: MEDICARE

## 2023-05-03 DIAGNOSIS — Z12.11 COLON CANCER SCREENING: Primary | ICD-10-CM

## 2023-05-03 NOTE — TELEPHONE ENCOUNTER
Patient came to the window and said that he decided not to do the colonscopy and now wants to do the cologuard.

## 2023-05-03 NOTE — TELEPHONE ENCOUNTER
Dr Ríos authorized the change to cologuard. Patient's last colonoscopy in 2011, 1 polyp removed by Dr Hayden. No other risk factors.

## 2023-05-31 DIAGNOSIS — I10 ESSENTIAL HYPERTENSION: ICD-10-CM

## 2023-06-01 RX ORDER — LOSARTAN POTASSIUM 50 MG/1
TABLET ORAL
Qty: 180 TABLET | Refills: 1 | OUTPATIENT
Start: 2023-06-01

## 2023-09-22 ENCOUNTER — LAB (OUTPATIENT)
Dept: LAB | Facility: OTHER | Age: 68
End: 2023-09-22
Payer: MEDICARE

## 2023-09-22 DIAGNOSIS — M10.071 ACUTE IDIOPATHIC GOUT OF RIGHT ANKLE: ICD-10-CM

## 2023-09-22 DIAGNOSIS — Z12.5 SPECIAL SCREENING FOR MALIGNANT NEOPLASM OF PROSTATE: ICD-10-CM

## 2023-09-22 DIAGNOSIS — M1A.0710 IDIOPATHIC CHRONIC GOUT OF RIGHT ANKLE WITHOUT TOPHUS: Chronic | ICD-10-CM

## 2023-09-22 DIAGNOSIS — R73.01 IMPAIRED FASTING GLUCOSE: Chronic | ICD-10-CM

## 2023-09-22 DIAGNOSIS — E78.2 MIXED HYPERLIPIDEMIA: Chronic | ICD-10-CM

## 2023-09-22 DIAGNOSIS — I10 ESSENTIAL HYPERTENSION: Chronic | ICD-10-CM

## 2023-09-22 DIAGNOSIS — E66.3 OVERWEIGHT (BMI 25.0-29.9): Chronic | ICD-10-CM

## 2023-09-22 LAB
ALBUMIN SERPL-MCNC: 4.1 G/DL (ref 3.5–5)
ALBUMIN/GLOB SERPL: 1.6 G/DL (ref 1.1–1.8)
ALP SERPL-CCNC: 72 U/L (ref 38–126)
ALT SERPL W P-5'-P-CCNC: 37 U/L
ANION GAP SERPL CALCULATED.3IONS-SCNC: 6 MMOL/L (ref 5–15)
AST SERPL-CCNC: 25 U/L (ref 17–59)
BASOPHILS # BLD AUTO: 0.03 10*3/MM3 (ref 0–0.2)
BASOPHILS NFR BLD AUTO: 0.5 % (ref 0–1.5)
BILIRUB SERPL-MCNC: 0.7 MG/DL (ref 0–1.2)
BUN SERPL-MCNC: 20 MG/DL (ref 7–23)
BUN/CREAT SERPL: 18.2 (ref 7–25)
CALCIUM SPEC-SCNC: 9.1 MG/DL (ref 8.4–10.2)
CHLORIDE SERPL-SCNC: 105 MMOL/L (ref 101–112)
CHOLEST SERPL-MCNC: 155 MG/DL (ref 150–200)
CO2 SERPL-SCNC: 29 MMOL/L (ref 22–30)
CREAT SERPL-MCNC: 1.1 MG/DL (ref 0.7–1.3)
DEPRECATED RDW RBC AUTO: 41.8 FL (ref 37–54)
EGFRCR SERPLBLD CKD-EPI 2021: 73.1 ML/MIN/1.73
EOSINOPHIL # BLD AUTO: 0.32 10*3/MM3 (ref 0–0.4)
EOSINOPHIL NFR BLD AUTO: 5 % (ref 0.3–6.2)
ERYTHROCYTE [DISTWIDTH] IN BLOOD BY AUTOMATED COUNT: 12.8 % (ref 12.3–15.4)
GLOBULIN UR ELPH-MCNC: 2.6 GM/DL (ref 2.3–3.5)
GLUCOSE SERPL-MCNC: 113 MG/DL (ref 70–99)
HBA1C MFR BLD: 6 % (ref 4.8–5.6)
HCT VFR BLD AUTO: 42.7 % (ref 37.5–51)
HDLC SERPL-MCNC: 34 MG/DL (ref 40–59)
HGB BLD-MCNC: 14.6 G/DL (ref 13–17.7)
LDLC SERPL CALC-MCNC: 109 MG/DL
LDLC/HDLC SERPL: 3.21 {RATIO} (ref 0–3.55)
LYMPHOCYTES # BLD AUTO: 1.39 10*3/MM3 (ref 0.7–3.1)
LYMPHOCYTES NFR BLD AUTO: 21.9 % (ref 19.6–45.3)
MCH RBC QN AUTO: 31.5 PG (ref 26.6–33)
MCHC RBC AUTO-ENTMCNC: 34.2 G/DL (ref 31.5–35.7)
MCV RBC AUTO: 92 FL (ref 79–97)
MONOCYTES # BLD AUTO: 0.53 10*3/MM3 (ref 0.1–0.9)
MONOCYTES NFR BLD AUTO: 8.3 % (ref 5–12)
NEUTROPHILS NFR BLD AUTO: 4.09 10*3/MM3 (ref 1.7–7)
NEUTROPHILS NFR BLD AUTO: 64.3 % (ref 42.7–76)
PLATELET # BLD AUTO: 254 10*3/MM3 (ref 140–450)
PMV BLD AUTO: 8.6 FL (ref 6–12)
POTASSIUM SERPL-SCNC: 4.5 MMOL/L (ref 3.4–5)
PROT SERPL-MCNC: 6.7 G/DL (ref 6.3–8.6)
PSA SERPL-MCNC: 0.74 NG/ML (ref 0–4)
RBC # BLD AUTO: 4.64 10*6/MM3 (ref 4.14–5.8)
SODIUM SERPL-SCNC: 140 MMOL/L (ref 137–145)
TRIGL SERPL-MCNC: 60 MG/DL
TSH SERPL DL<=0.05 MIU/L-ACNC: 1.88 UIU/ML (ref 0.27–4.2)
URATE SERPL-MCNC: 6.4 MG/DL (ref 3.5–8.5)
VLDLC SERPL-MCNC: 12 MG/DL (ref 5–40)
WBC NRBC COR # BLD: 6.36 10*3/MM3 (ref 3.4–10.8)

## 2023-09-22 PROCEDURE — 85025 COMPLETE CBC W/AUTO DIFF WBC: CPT | Performed by: INTERNAL MEDICINE

## 2023-09-22 PROCEDURE — 84443 ASSAY THYROID STIM HORMONE: CPT | Performed by: INTERNAL MEDICINE

## 2023-09-22 PROCEDURE — 80053 COMPREHEN METABOLIC PANEL: CPT | Performed by: INTERNAL MEDICINE

## 2023-09-22 PROCEDURE — G0103 PSA SCREENING: HCPCS | Performed by: INTERNAL MEDICINE

## 2023-09-22 PROCEDURE — 84550 ASSAY OF BLOOD/URIC ACID: CPT | Performed by: INTERNAL MEDICINE

## 2023-09-22 PROCEDURE — 83036 HEMOGLOBIN GLYCOSYLATED A1C: CPT | Performed by: INTERNAL MEDICINE

## 2023-09-22 PROCEDURE — 36415 COLL VENOUS BLD VENIPUNCTURE: CPT

## 2023-09-22 PROCEDURE — 80061 LIPID PANEL: CPT | Performed by: INTERNAL MEDICINE

## 2023-09-22 PROCEDURE — 36415 COLL VENOUS BLD VENIPUNCTURE: CPT | Performed by: INTERNAL MEDICINE

## 2023-09-24 NOTE — PROGRESS NOTES
"Chief Complaint  Follow-up (6 month), skin lesions, and Earache (left)    Subjective        Don Oscar presents to [facility]  History of Present Illness    Don is seen today for 6-month follow up on hyperlipidemia, hypertension, GERD, gout and impaired fasting glucose, among other issues. He continues on prophylactic allopurinol for gout.     Blood Pressure is improved and reasonable today.  Weight is down 4 pounds in the past 6 months.    Last colonoscopy was 5/2013.  He had a negative Cologuard this year, 5/2023.  I have updated his health maintenance to reflect this.  He denies any bowel changes.    Labs are stable and reviewed with the patient.  Uric acid is at goal.  He has had just some minimal gout symptoms occasionally this summer, but doing well  Objective   Vital Signs:  /68   Pulse 69   Temp 98.4 °F (36.9 °C)   Ht 172.7 cm (68\")   Wt 76.9 kg (169 lb 9.6 oz)   SpO2 99%   BMI 25.79 kg/m²   Estimated body mass index is 25.79 kg/m² as calculated from the following:    Height as of this encounter: 172.7 cm (68\").    Weight as of this encounter: 76.9 kg (169 lb 9.6 oz).               Physical Exam  Vitals reviewed.   Constitutional:       General: He is not in acute distress.     Appearance: He is well-developed.   HENT:      Head: Normocephalic and atraumatic.      Ears:      Comments: Possibly mucous behind left TM.  Left ear canal unremarkable.       Nose:      Right Sinus: No maxillary sinus tenderness or frontal sinus tenderness.      Left Sinus: No maxillary sinus tenderness or frontal sinus tenderness.      Mouth/Throat:      Mouth: No oral lesions.      Pharynx: Uvula midline.      Tonsils: No tonsillar exudate.   Eyes:      Conjunctiva/sclera: Conjunctivae normal.      Pupils: Pupils are equal, round, and reactive to light.   Neck:      Thyroid: No thyroid mass or thyromegaly.      Vascular: No carotid bruit or JVD.      Trachea: " Trachea normal. No tracheal deviation.   Cardiovascular:      Rate and Rhythm: Normal rate and regular rhythm. No extrasystoles are present.     Chest Wall: PMI is not displaced.      Heart sounds: Normal heart sounds. No murmur heard.  Pulmonary:      Effort: Pulmonary effort is normal. No accessory muscle usage or respiratory distress.      Breath sounds: Normal breath sounds. No decreased breath sounds, wheezing, rhonchi or rales.      Comments: A few chronic lung sounds.   Abdominal:      General: Bowel sounds are normal. There is no distension.      Palpations: Abdomen is soft.      Tenderness: There is no abdominal tenderness.   Musculoskeletal:      Cervical back: Neck supple.      Comments: Warmth, tenderness, and [id number] right lateral malleolus.  No tophi noted.      Lymphadenopathy:      Cervical: No cervical adenopathy.   Skin:     General: Skin is warm and dry.      Findings: No rash.      Nails: There is no clubbing.   Neurological:      Mental Status: He is alert and oriented to person, place, and time. Mental status is at baseline.      Cranial Nerves: No cranial nerve deficit.      Coordination: Coordination normal.   Psychiatric:         Speech: Speech normal.         Behavior: Behavior normal.         Thought Content: Thought content normal.         Judgment: Judgment normal.      Result Review :  The following data was reviewed by: Abel Ríos MD on 09/25/2023:  Common labs          2/18/2023    12:47 3/17/2023    08:02 9/22/2023    07:35   Common Labs   Glucose  115  113    Glucose 115         BUN 14     21  20    Creatinine 1.2     1.14  1.10    Sodium 137     137  140    Potassium 4.0     4.5  4.5    Chloride 100     103  105    Calcium 9.1     8.9  9.1    Albumin 3.8     3.9  4.1    Total Bilirubin 0.60     0.6  0.7    Alkaline Phosphatase 107     98  72    AST (SGOT) 21     25  25    ALT (SGPT) 41     39  37    WBC  6.41  6.36    Hemoglobin  13.8  14.6    Hematocrit  41.0  42.7     Platelets  287  254    Total Cholesterol   155    Triglycerides   60    HDL Cholesterol   34    LDL Cholesterol   116  109    Hemoglobin A1C  6.10  6.00    PSA   0.737    Uric Acid  6.7  6.4       Details          This result is from an external source.             Data reviewed : GI studies Cologuard results 5/2023             Assessment and Plan   Diagnoses and all orders for this visit:    1. Mixed hyperlipidemia (Primary)  -     Comprehensive Metabolic Panel; Future  -     LDL Cholesterol, Direct; Future    2. Essential hypertension  -     CBC Auto Differential; Future  -     Comprehensive Metabolic Panel; Future    3. Impaired fasting glucose  -     Hemoglobin A1c; Future    4. Idiopathic chronic gout of right ankle without tophus  -     Comprehensive Metabolic Panel; Future  -     Uric Acid; Future    5. Secondhand smoke exposure    6. Sensorineural hearing loss (SNHL) of both ears    7. Skin tag    8. Seborrheic keratoses    Continue current dose Lipitor.  Cholesterol ratio is not fully at goal.  Try to improve diet.  I will consider more intensive therapy in the future if needed    Blood pressure is fairly reasonable.  Continue the valsartan and pursue sodium restriction.    IFG is stable.  Reduce carbohydrate intake.  He has been drinking a little more beer, which is discouraged    Continue allopurinol for gout prophylaxis.  Gout symptoms are adequately controlled.  He rarely requires any NSAIDs.    He will return soon to address the skin lesions of the neck that are giving him problems.  I will perform electrocautery on the skin tag on the right side of the neck and possibly the largest SK on the left side of the neck.  He will need cryotherapy of some other SKs of the neck that are bothering him    Return to clinic in 6 months with fasting labs prior.         Follow Up   Return in about 6 months (around 3/25/2024) for Next scheduled follow up - labs 1 week prior.  Patient was given instructions and  counseling regarding his condition or for health maintenance advice. Please see specific information pulled into the AVS if appropriate.

## 2023-09-25 ENCOUNTER — OFFICE VISIT (OUTPATIENT)
Dept: FAMILY MEDICINE CLINIC | Facility: CLINIC | Age: 68
End: 2023-09-25

## 2023-09-25 VITALS
HEIGHT: 68 IN | HEART RATE: 69 BPM | SYSTOLIC BLOOD PRESSURE: 138 MMHG | WEIGHT: 169.6 LBS | OXYGEN SATURATION: 99 % | DIASTOLIC BLOOD PRESSURE: 68 MMHG | BODY MASS INDEX: 25.7 KG/M2 | TEMPERATURE: 98.4 F

## 2023-09-25 DIAGNOSIS — H90.3 SENSORINEURAL HEARING LOSS (SNHL) OF BOTH EARS: Chronic | ICD-10-CM

## 2023-09-25 DIAGNOSIS — R73.01 IMPAIRED FASTING GLUCOSE: Chronic | ICD-10-CM

## 2023-09-25 DIAGNOSIS — I10 ESSENTIAL HYPERTENSION: Chronic | ICD-10-CM

## 2023-09-25 DIAGNOSIS — M1A.0710 IDIOPATHIC CHRONIC GOUT OF RIGHT ANKLE WITHOUT TOPHUS: Chronic | ICD-10-CM

## 2023-09-25 DIAGNOSIS — L91.8 SKIN TAG: ICD-10-CM

## 2023-09-25 DIAGNOSIS — Z77.22 SECONDHAND SMOKE EXPOSURE: Chronic | ICD-10-CM

## 2023-09-25 DIAGNOSIS — L82.1 SEBORRHEIC KERATOSES: ICD-10-CM

## 2023-09-25 DIAGNOSIS — E78.2 MIXED HYPERLIPIDEMIA: Primary | Chronic | ICD-10-CM

## 2023-09-25 PROCEDURE — 3078F DIAST BP <80 MM HG: CPT | Performed by: INTERNAL MEDICINE

## 2023-09-25 PROCEDURE — 99214 OFFICE O/P EST MOD 30 MIN: CPT | Performed by: INTERNAL MEDICINE

## 2023-09-25 PROCEDURE — 1160F RVW MEDS BY RX/DR IN RCRD: CPT | Performed by: INTERNAL MEDICINE

## 2023-09-25 PROCEDURE — 3075F SYST BP GE 130 - 139MM HG: CPT | Performed by: INTERNAL MEDICINE

## 2023-09-25 PROCEDURE — 1159F MED LIST DOCD IN RCRD: CPT | Performed by: INTERNAL MEDICINE
